# Patient Record
Sex: FEMALE | Race: WHITE | NOT HISPANIC OR LATINO | Employment: FULL TIME | URBAN - METROPOLITAN AREA
[De-identification: names, ages, dates, MRNs, and addresses within clinical notes are randomized per-mention and may not be internally consistent; named-entity substitution may affect disease eponyms.]

---

## 2017-01-24 ENCOUNTER — ALLIED HEALTH/NURSE VISIT (OUTPATIENT)
Dept: NURSING | Facility: CLINIC | Age: 47
End: 2017-01-24
Payer: COMMERCIAL

## 2017-01-24 VITALS
DIASTOLIC BLOOD PRESSURE: 82 MMHG | HEIGHT: 64 IN | BODY MASS INDEX: 34.49 KG/M2 | TEMPERATURE: 97 F | WEIGHT: 202 LBS | RESPIRATION RATE: 15 BRPM | HEART RATE: 76 BPM | SYSTOLIC BLOOD PRESSURE: 146 MMHG

## 2017-01-24 DIAGNOSIS — Z30.9 CONTRACEPTIVE MANAGEMENT: Primary | ICD-10-CM

## 2017-01-24 PROCEDURE — 99207 ZZC NO CHARGE NURSE ONLY: CPT

## 2017-01-24 PROCEDURE — 96372 THER/PROPH/DIAG INJ SC/IM: CPT

## 2017-01-24 NOTE — NURSING NOTE
"Chief Complaint   Patient presents with     Imm/Inj     depo provera       Initial /82 mmHg  Pulse 76  Temp(Src) 97  F (36.1  C)  Resp 15  Ht 5' 3.5\" (1.613 m)  Wt 202 lb (91.627 kg)  BMI 35.22 kg/m2 Estimated body mass index is 35.22 kg/(m^2) as calculated from the following:    Height as of this encounter: 5' 3.5\" (1.613 m).    Weight as of this encounter: 202 lb (91.627 kg).  BP completed using cuff size: large right arm    Gina Heath MA    The following medication was given:     MEDICATION: Depo Provera 150mg  ROUTE: IM  SITE: Deltoid - Right  DOSE: 1ml  LOT #: [id number]  :  [employer]   EXPIRATION DATE:  07/2019  NDC#: [address]-4  Next injection due on 4/11 to 4/25/17. Card given to patient date.  Gina Heath MA  "

## 2017-04-12 ENCOUNTER — ALLIED HEALTH/NURSE VISIT (OUTPATIENT)
Dept: NURSING | Facility: CLINIC | Age: 47
End: 2017-04-12
Payer: COMMERCIAL

## 2017-04-12 DIAGNOSIS — Z30.9 CONTRACEPTION MANAGEMENT: Primary | ICD-10-CM

## 2017-04-12 PROCEDURE — 99207 ZZC NO CHARGE NURSE ONLY: CPT

## 2017-04-12 PROCEDURE — 96372 THER/PROPH/DIAG INJ SC/IM: CPT

## 2017-04-12 NOTE — MR AVS SNAPSHOT
"              After Visit Summary   2017    Elisabet Dominguez    MRN: [id number]           Patient Information     Date Of Birth          1970        Visit Information        Provider Department      2017 11:30 AM [facility]        Today's Diagnoses     Contraception management    -  1       Follow-ups after your visit        Who to contact     If you have questions or need follow up information about today's clinic visit or your schedule please contact [facility] directly at [phone].  Normal or non-critical lab and imaging results will be communicated to you by TopiVerthart, letter or phone within 4 business days after the clinic has received the results. If you do not hear from us within 7 days, please contact the clinic through TopiVerth[employer] or phone. If you have a critical or abnormal lab result, we will notify you by phone as soon as possible.  Submit refill requests through Matchbox or call your pharmacy and they will forward the refill request to us. Please allow 3 business days for your refill to be completed.          Additional Information About Your Visit        MyChart Information     Matchbox lets you send messages to your doctor, view your test results, renew your prescriptions, schedule appointments and more. To sign up, go to [email or web address] . Click on \"Log in\" on the left side of the screen, which will take you to the Welcome page. Then click on \"Sign up Now\" on the right side of the page.     You will be asked to enter the access code listed below, as well as some personal information. Please follow the directions to create your username and password.     Your access code is: [email or web address]  Expires: 2017 12:00 PM     Your access code will  in 90 days. If you need help or a new code, please call your [facility]3] or [phone].        Care EveryWhere ID     This is your Care EveryWhere ID. This could be used by other organizations to " access your [place] medical records  [id number]         Blood Pressure from Last 3 Encounters:   01/24/17 146/82   05/20/16 114/82   04/14/16 138/88    Weight from Last 3 Encounters:   01/24/17 202 lb (91.6 kg)   05/20/16 200 lb (90.7 kg)   04/14/16 199 lb (90.3 kg)              We Performed the Following     INJECTION INTRAMUSCULAR OR SUB-Q     Medroxyprogesterone inj  1mg   (Depo Provera J-Code)        Primary Care Provider Office Phone # Fax #    Chloé Wilder [phone]       [address] [address]        Thank you!     Thank you for choosing [facility]  for your care. Our goal is always to provide you with excellent care. Hearing back from our patients is one way we can continue to improve our services. Please take a few minutes to complete the written survey that you may receive in the mail after your visit with us. Thank you!             Your Updated Medication List - Protect others around you: Learn how to safely use, store and throw away your medicines at www.disposemymeds.org.          This list is accurate as of: 4/12/17 12:00 PM.  Always use your most recent med list.                   Brand Name Dispense Instructions for use    medroxyPROGESTERone 150 MG/ML injection    DEPO-PROVERA    1 mL    Inject 1 mL (150 mg) into the muscle every 3 months

## 2017-04-12 NOTE — PROGRESS NOTES
Chief Complaint   Patient presents with     Allied Health Visit     Imm/Inj     Depo     Prior to injection verified patient identity using patient's name and date of birth.    Per orders of Dr. Wilder, injection of Depo provera given by Susan Benedict. Patient instructed to remain in clinic for 20 minutes afterwards, and to report any adverse reaction to me immediately.

## 2017-05-17 ENCOUNTER — OFFICE VISIT (OUTPATIENT)
Dept: ORTHOPEDICS | Facility: CLINIC | Age: 47
End: 2017-05-17
Payer: COMMERCIAL

## 2017-05-17 VITALS
SYSTOLIC BLOOD PRESSURE: 146 MMHG | DIASTOLIC BLOOD PRESSURE: 90 MMHG | WEIGHT: 199.8 LBS | BODY MASS INDEX: 34.11 KG/M2 | RESPIRATION RATE: 16 BRPM | HEART RATE: 101 BPM | HEIGHT: 64 IN

## 2017-05-17 DIAGNOSIS — G56.31 SUPERFICIAL RADIAL NERVE LESION, RIGHT: Primary | ICD-10-CM

## 2017-05-17 PROCEDURE — 99203 OFFICE O/P NEW LOW 30 MIN: CPT | Performed by: ORTHOPAEDIC SURGERY

## 2017-05-17 ASSESSMENT — PAIN SCALES - GENERAL: PAINLEVEL: NO PAIN (1)

## 2017-05-17 NOTE — Clinical Note
5/17/2017       RE: Elisabet Dominguez  [address]   [place] [address]           Dear Colleague,    Thank you for referring your patient, Elisabet Dominguez, to the [facility]. Please see a copy of my visit note below.    CHIEF COMPLAINT:   Chief Complaint   Patient presents with     Thumb Discomfort     Right thumb numbness. Onset: 2 weeks. NKI. Patient states her thumb is numb to the touch. Denies any pain. Over the last couple of weeks the numbness has gotten progressively worse, especially when stretching right arm out. No treatments.       HISTORY:  Elisabet Dominguez is a 47 year old female, Right -hand dominant who is seen in for right hand numbness. She states these symptoms started 2 weeks ago, with no known injury. She reports her thumb is numb to the touch. Denies any pain today. Over the last couple of weeks, she states numbness have gotten progressively worse. Numbing sensation with stretching. She states that there is no pain. Dorsal and radial thumb numbness that radiates into the wrist. No treatments. No previous numbness and tingling.      Suspected cause: Due to unknown factors.    Pain severity: 1/10  Pain quality: dull and pins and needles  Frequency of symptoms: frequently.  Aggravating Factors: with stretching.  Relieving Factors: at rest.  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    Surgical Hx:  has a past surgical history that includes cryotherapy, cervical (1995).    Medications:   Current Outpatient Prescriptions:      medroxyPROGESTERone (DEPO-PROVERA) 150 MG/ML injection, Inject 1 mL (150 mg) into the muscle every 3 months,  "Disp: 1 mL, Rfl: 4    Allergies:   Allergies   Allergen Reactions     Nkda [No Known Drug Allergies]        Social Hx:  reports that she quit smoking about 13 months ago. Her smoking use included Cigarettes. She has a 2.00 pack-year smoking history. She has never used smokeless tobacco. She reports that she drinks about 1.2 oz of alcohol per week  She reports that she does not use illicit drugs.    Family Hx: family history includes C.A.D. in her maternal aunt, maternal grandmother, and maternal uncle; C.A.D. (age of onset: 63) in her mother; CANCER in her father; Hypertension in her father. There is no history of Breast Cancer, Cancer - colorectal, or DIABETES.. Negative for bleeding/clotting disorders. Negative for adverse anesthesia reactions.    This document serves as a record of the services and decisions personally performed and made by Benton Coates MD. It was created on his behalf by Kasia Olson, a trained medical scribe. The creation of this document is based the provider's statements to the medical scribe.    Scribe Kasia Olson 8:52 AM 5/17/2017     REVIEW OF SYSTEMS: 10 point ROS neg other than the symptoms noted above in the HPI and PMH. Notables include  CONSTITUTIONAL:NEGATIVE for fever, chills, change in weight  INTEGUMENTARY/SKIN: NEGATIVE for worrisome rashes, moles or lesions  MUSCULOSKELETAL:See HPI above  Neurology: see HPI above.      EXAM:  /90  Pulse 101  Resp 16  Ht 1.613 m (5' 3.5\")  Wt 90.6 kg (199 lb 12.8 oz)  BMI 34.84 kg/m2  GENERAL APPEARANCE: healthy, alert and no distress   GAIT: NORMAL  SKIN: no suspicious lesions or rashes  RESPIRATORY: No increased work of breathing.  NEURO:     strength: normal,    thenar fasiculations: negative    Thenar atrophy:none.    Sensation intact in  all digits,    reflexes normal in upper extremities.   PSYCH:  mentation appears normal and affect normal, not anxious.    MUSCULOSKELETAL:    RIGHT HAND/FINGERS:    Skin intact. No abnormal " skin discoloration, erythema or ecchymosis. Mild arthritic changes of the fingers.  No nail pitting or clubbing.  Normal wear pattern, color and tone.  No observable or palpable masses of the fingers or palm or wrist.  No palpable triggering of fingers.   No observable or palpable cords or nodules of the fingers or palm.    There is no swelling in the wrist, hand, forearm.  There is no tenderness in the wrist.  There is no ecchymosis.  There is no erythema of the surrounding skin.  There is no maceration of the skin.  There is no deformity in the area.  There is mild numbness and tingling sensation over the dorsum of the thumb.  Intact extensors. No extensor lag.    Special tests wrist:    Tinel's negative, negative over superficial radial nerve   Phalen's not tested.    Median nerve compression test negative.   Finkelstein's negative    Intact sensation light touch median, radial, ulnar nerves of the hand  Intact sensation to the radial and ulnar digital nerves of the fingers, as well as the finger tips.  Intact epl fpl fdp edc wrist flexion/extension biceps/triceps deltoid  Brisk capillary refill to all fingers.   Palpable radial pulse, 2+.      LEFT HAND/FINGERS:    Skin intact. No abnormal skin discoloration, erythema or ecchymosis. Mild arthritic changes of the fingers, notably left index finger.  No nail pitting or clubbing.  Normal wear pattern, color and tone.  No observable or palpable masses of the fingers or palm or wrist.  No palpable triggering of fingers.   No observable or palpable cords or nodules of the fingers or palm.    There is no swelling in the wrist, hand, forearm.  There is no tenderness in the wrist.  There is no ecchymosis.  There is no erythema of the surrounding skin.  There is no maceration of the skin.  There is no deformity in the area.  Intact extensors. No extensor lag.    Special tests wrist:    Tinel's negative,    Phalen's not tested.    Median nerve compression test  negative.   Finkelstein's negative    Intact sensation light touch median, radial, ulnar nerves of the hand  Intact sensation to the radial and ulnar digital nerves of the fingers, as well as the finger tips.  Intact epl fpl fdp edc wrist flexion/extension biceps/triceps deltoid  Brisk capillary refill to all fingers.   Palpable radial pulse, 2+.    XRAYS: none taken.      ASSESSMENT: 47 year old right-handed female with right thumb numbness and tingling, superficial radial nerve agitation.    PLAN:  * Reviewed clinical exam findings.   * Discussed that based on examination, it is not likely that she has carpal tunnel syndrome.  * Explained that her symptoms are likely being caused by agitation of the superficial radial nerve.  * Typically, carpal tunnel syndrome affect the tips of the fingertips and palm, rather than over the top of the thumb.  * Advised patient that treatment options are non-operative treatment at this time.  * likely to resolve on its own    * Rest  * Activity modification - avoid activities that aggravate symptoms.  * NSAIDS - regular use for inflammation, with food, as long as no contra-indications. Please discuss with pcp if needed.  * Ice twice daily to three times daily.  * Elevation of extremity to reduce swelling  * PT ordered for strengthening, stretching and range of motion exercises  * Tylenol as needed for pain  * Return to clinic as needed.     The information in this document, created by a scribe for me, accurately reflects the services I personally performed and the decisions made by me. I have reviewed and approved this document for accuracy.      Benton Coates M.D., M.S.  Dept. of Orthopaedic Surgery  [facility]      Again, thank you for allowing me to participate in the care of your patient.        Sincerely,              Benton Coates MD

## 2017-05-17 NOTE — NURSING NOTE
"Chief Complaint   Patient presents with     Thumb Discomfort     Right thumb numbness. Onset: 2 weeks. NKI. Patient states her thumb is numb to the touch. Denies any pain. Over the last couple of weeks the numbness has gotten progressively worse, especially when stretching right arm out. No treatments.       Initial /90  Pulse 101  Resp 16  Ht 1.613 m (5' 3.5\")  Wt 90.6 kg (199 lb 12.8 oz)  BMI 34.84 kg/m2 Estimated body mass index is 34.84 kg/(m^2) as calculated from the following:    Height as of this encounter: 1.613 m (5' 3.5\").    Weight as of this encounter: 90.6 kg (199 lb 12.8 oz).  Medication Reconciliation: gina Clemente Certified Medical Assistant    "

## 2017-05-17 NOTE — LETTER
5/17/2017      RE: Elisabet Dominguez  [address]   [place] [address]       CHIEF COMPLAINT:   Chief Complaint   Patient presents with     Thumb Discomfort     Right thumb numbness. Onset: 2 weeks. NKI. Patient states her thumb is numb to the touch. Denies any pain. Over the last couple of weeks the numbness has gotten progressively worse, especially when stretching right arm out. No treatments.       HISTORY:  Elisabet Dominguez is a 47 year old female, Right -hand dominant who is seen in for right thumb numbness. She states these symptoms started 2 weeks ago, with no known injury. She reports her thumb is numb to the touch over the top. Denies any pain today. Over the last couple of weeks, she states numbness have gotten progressively worse. Numbing sensation with stretching of her arm/shoulder. She states that there is no pain. Dorsal and radial thumb numbness that radiates into the wrist. No treatments. No previous numbness and tingling.      Suspected cause: stretching out the right arm  Pain severity: 1/10  Pain quality: dull and pins and needles  Frequency of symptoms: frequently.  Aggravating Factors: with stretching.  Relieving Factors: at rest.  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  Patient Active Problem List    Diagnosis Date Noted     Hypertension goal BP (blood pressure) < 140/90      Obesity 06/24/2011     CARDIOVASCULAR SCREENING; LDL GOAL LESS THAN 160 10/31/2010       Surgical Hx:  has a past surgical history that includes cryotherapy, cervical (1995).    Medications:   Current Outpatient Prescriptions:      medroxyPROGESTERone  "(DEPO-PROVERA) 150 MG/ML injection, Inject 1 mL (150 mg) into the muscle every 3 months, Disp: 1 mL, Rfl: 4    Allergies:   Allergies   Allergen Reactions     Nkda [No Known Drug Allergies]        Social Hx: works in [employer].  reports that she quit smoking about 13 months ago. Her smoking use included Cigarettes. She has a 2.00 pack-year smoking history. She has never used smokeless tobacco. She reports that she drinks about 1.2 oz of alcohol per week  She reports that she does not use illicit drugs.    Family Hx: family history includes C.A.D. in her maternal aunt, maternal grandmother, and maternal uncle; C.A.D. (age of onset: 63) in her mother; CANCER in her father; Hypertension in her father. There is no history of Breast Cancer, Cancer - colorectal, or DIABETES.    This document serves as a record of the services and decisions personally performed and made by Benton Coates MD. It was created on his behalf by Kasia Olson, a trained medical scribe. The creation of this document is based the provider's statements to the medical scribe.    Scribe Kasia Olson 8:52 AM 5/17/2017     REVIEW OF SYSTEMS: 10 point ROS neg other than the symptoms noted above in the HPI and PMH. Notables include  CONSTITUTIONAL:NEGATIVE for fever, chills, change in weight  INTEGUMENTARY/SKIN: NEGATIVE for worrisome rashes, moles or lesions  MUSCULOSKELETAL:See HPI above  Neurology: see HPI above.      EXAM:  /90  Pulse 101  Resp 16  Ht 1.613 m (5' 3.5\")  Wt 90.6 kg (199 lb 12.8 oz)  BMI 34.84 kg/m2  GENERAL APPEARANCE: healthy, alert and no distress   GAIT: NORMAL  SKIN: no suspicious lesions or rashes  RESPIRATORY: No increased work of breathing.  NEURO:     strength: normal,    thenar fasiculations: negative    Thenar atrophy:none.    Sensation intact in  all digits,    reflexes normal in upper extremities.   PSYCH:  mentation appears normal and affect normal, not anxious.    MUSCULOSKELETAL:    RIGHT HAND/FINGERS:  "   Skin intact. No abnormal skin discoloration, erythema or ecchymosis. Mild arthritic changes of the fingers at the IP joints.  No nail pitting or clubbing.  Normal wear pattern, color and tone.  No observable or palpable masses of the fingers or palm or wrist.  No palpable triggering of fingers.   No observable or palpable cords or nodules of the fingers or palm.    There is no swelling in the wrist, hand, forearm.  There is no tenderness in the wrist.  There is no ecchymosis.  There is no erythema of the surrounding skin.  There is no maceration of the skin.  There is no deformity in the area.  There is mild numbness and tingling sensation over the dorsum of the thumb.  Intact extensors. No extensor lag.    Special tests wrist:    Tinel's negative, negative over superficial radial nerve   Phalen's not tested.    Median nerve compression test negative.   Finkelstein's negative    Intact sensation light touch median, radial, ulnar nerves of the hand  Intact sensation to the radial and ulnar digital nerves of the fingers, as well as the finger tips.  Intact epl fpl fdp edc wrist flexion/extension biceps/triceps deltoid  Brisk capillary refill to all fingers.   Palpable radial pulse, 2+.      LEFT HAND/FINGERS:    Skin intact. No abnormal skin discoloration, erythema or ecchymosis. Mild arthritic changes of the fingers, notably left index finger DIP joint  No nail pitting or clubbing.  Normal wear pattern, color and tone.  No observable or palpable masses of the fingers or palm or wrist.  No palpable triggering of fingers.   No observable or palpable cords or nodules of the fingers or palm.    There is no swelling in the wrist, hand, forearm.  There is no tenderness in the wrist.  There is no ecchymosis.  There is no erythema of the surrounding skin.  There is no maceration of the skin.  There is no deformity in the area.  Intact extensors. No extensor lag.    Special tests wrist:    Tinel's negative,    Phalen's not  tested.    Median nerve compression test negative.   Finkelstein's negative    Intact sensation light touch median, radial, ulnar nerves of the hand  Intact sensation to the radial and ulnar digital nerves of the fingers, as well as the finger tips.  Intact epl fpl fdp edc wrist flexion/extension biceps/triceps deltoid  Brisk capillary refill to all fingers.   Palpable radial pulse, 2+.    XRAYS: none taken.      ASSESSMENT: 47 year old right-handed female with right thumb numbness and tingling, superficial radial nerve agitation.    PLAN:  * Reviewed clinical exam findings.   * Discussed that based on examination, it is not likely that she has carpal tunnel syndrome.  * Explained that her symptoms are likely being caused by agitation of the superficial radial nerve.  * Typically, carpal tunnel syndrome affect the tips of the fingertips and palm, rather than over the top of the thumb.  * Advised patient that treatment options are non-operative treatment at this time.  * likely to resolve on its own    * Return to clinic as needed. If symptoms worsen, likely referral to neurology.     The information in this document, created by a scribe for me, accurately reflects the services I personally performed and the decisions made by me. I have reviewed and approved this document for accuracy.      Benton Coates M.D., M.S.  Dept. of Orthopaedic Surgery  [facility]            Benton Coates MD

## 2017-05-17 NOTE — PATIENT INSTRUCTIONS
Please remember to call and schedule a follow up appointment if one was recommended at your earliest convenience.  Orthopedics CLINIC HOURS TELEPHONE NUMBER   Dr. Aminata Davis  Certified Medical Assistant   Monday & Wednesday   8am - 5pm  Thursday 1pm - 5pm  Friday 8am -11:30am Specialty schedulers:   [phone] to schedule your surgery.  Main Clinic:   [phone] to make an appointment with any provider.    Urgent Care locations:    [facility] Monday-Friday Closed  Saturday-Sunday 9am-5pm      Monday-Friday 12pm - 8pm  Saturday-Sunday 9am-5pm [phone]     If SURGERY has been recommended, please call our Specialty Schedulers at [phone] to schedule your procedure.    If you need a medication refill, please contact your pharmacy. Please allow 3 business days for your refill to be completed.    If an MRI or CT scan has been recommended, please call [facility] Schedulers at [phone] to schedule your appointment.  Use [employer] (secure e-mail communication and access to your chart) to send a message or to make an appointment. Please ask how you can sign up for [employer].  Your care team's suggested websites for health information:   [email or web address] : Up to date and easily searchable information on multiple topics.   [email or web address] : [place] dept of heat, public health issues in [place], N1N1

## 2017-05-17 NOTE — MR AVS SNAPSHOT
After Visit Summary   5/17/2017    Elisabet Dominguez    MRN: [id number]           Patient Information     Date Of Birth          1970        Visit Information        Provider Department      5/17/2017 8:00 AM Benton Coates MD [facility]        Care Instructions    Please remember to call and schedule a follow up appointment if one was recommended at your earliest convenience.  Orthopedics CLINIC HOURS TELEPHONE NUMBER   Dr. Aminata Davis  Certified Medical Assistant   Monday & Wednesday   8am - 5pm  Thursday 1pm - 5pm  Friday 8am -11:30am Specialty schedulers:   [phone] to schedule your surgery.  Main Clinic:   [phone] to make an appointment with any provider.    Urgent Care locations:    [facility] Monday-Friday Closed  Saturday-Sunday 9am-5pm      Monday-Friday 12pm - 8pm  Saturday-Sunday 9am-5pm [phone]     If SURGERY has been recommended, please call our Specialty Schedulers at [phone] to schedule your procedure.    If you need a medication refill, please contact your pharmacy. Please allow 3 business days for your refill to be completed.    If an MRI or CT scan has been recommended, please call [facility] Schedulers at [phone] to schedule your appointment.  Use Apontadort (secure e-mail communication and access to your chart) to send a message or to make an appointment. Please ask how you can sign up for JeNaCell.  Your care team's suggested websites for health information:   [email or web address] : Up to date and easily searchable information on multiple topics.   [email or web address] : [place] dept of heat, public health issues in [place], N1N1            Follow-ups after your visit        Who to contact     If you have questions or need follow up information about today's clinic visit or your schedule please contact [facility] directly at [phone].  Normal or non-critical lab  "and imaging results will be communicated to you by MyChart, letter or phone within 4 business days after the clinic has received the results. If you do not hear from us within 7 days, please contact the clinic through TeraD[employer]et or phone. If you have a critical or abnormal lab result, we will notify you by phone as soon as possible.  Submit refill requests through GridBridge or call your pharmacy and they will forward the refill request to us. Please allow 3 business days for your refill to be completed.          Additional Information About Your Visit        "[employer] Information     GridBridge lets you send messages to your doctor, view your test results, renew your prescriptions, schedule appointments and more. To sign up, go to [email or web address] . Click on \"Log in\" on the left side of the screen, which will take you to the Welcome page. Then click on \"Sign up Now\" on the right side of the page.     You will be asked to enter the access code listed below, as well as some personal information. Please follow the directions to create your username and password.     Your access code is: [email or web address]  Expires: 2017 12:00 PM     Your access code will  in 90 days. If you need help or a new code, please call your [place] clinic or [phone].        Care EveryWhere ID     This is your Care EveryWhere ID. This could be used by other organizations to access your [place] medical records  [id number]        Your Vitals Were     Pulse Respirations Height BMI (Body Mass Index)          101 16 1.613 m (5' 3.5\") 34.84 kg/m2         Blood Pressure from Last 3 Encounters:   17 146/90   17 146/82   16 114/82    Weight from Last 3 Encounters:   17 90.6 kg (199 lb 12.8 oz)   17 91.6 kg (202 lb)   16 90.7 kg (200 lb)              Today, you had the following     No orders found for display       Primary Care Provider Office Phone # Fax #    Chloé Wilder [phone]       " [facility] [address] [place] [address]  [place] [address]        Thank you!     Thank you for choosing [facility]  for your care. Our goal is always to provide you with excellent care. Hearing back from our patients is one way we can continue to improve our services. Please take a few minutes to complete the written survey that you may receive in the mail after your visit with us. Thank you!             Your Updated Medication List - Protect others around you: Learn how to safely use, store and throw away your medicines at www.disposemymeds.org.          This list is accurate as of: 5/17/17  8:06 AM.  Always use your most recent med list.                   Brand Name Dispense Instructions for use    medroxyPROGESTERone 150 MG/ML injection    DEPO-PROVERA    1 mL    Inject 1 mL (150 mg) into the muscle every 3 months

## 2017-05-17 NOTE — PROGRESS NOTES
CHIEF COMPLAINT:   Chief Complaint   Patient presents with     Thumb Discomfort     Right thumb numbness. Onset: 2 weeks. NKI. Patient states her thumb is numb to the touch. Denies any pain. Over the last couple of weeks the numbness has gotten progressively worse, especially when stretching right arm out. No treatments.       HISTORY:  Elisabet Dominguez is a 47 year old female, Right -hand dominant who is seen in for right thumb numbness. She states these symptoms started 2 weeks ago, with no known injury. She reports her thumb is numb to the touch over the top. Denies any pain today. Over the last couple of weeks, she states numbness have gotten progressively worse. Numbing sensation with stretching of her arm/shoulder. She states that there is no pain. Dorsal and radial thumb numbness that radiates into the wrist. No treatments. No previous numbness and tingling.      Suspected cause: stretching out the right arm  Pain severity: 1/10  Pain quality: dull and pins and needles  Frequency of symptoms: frequently.  Aggravating Factors: with stretching.  Relieving Factors: at rest.  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  Patient Active Problem List    Diagnosis Date Noted     Hypertension goal BP (blood pressure) < 140/90      Obesity 06/24/2011     CARDIOVASCULAR SCREENING; LDL GOAL LESS THAN 160 10/31/2010       Surgical Hx:  has a past surgical history that includes cryotherapy, cervical (1995).    Medications:   Current Outpatient Prescriptions:      medroxyPROGESTERone (DEPO-PROVERA) 150 MG/ML injection, Inject 1 mL (150 mg) into the muscle every 3 months, Disp: 1  "mL, Rfl: 4    Allergies:   Allergies   Allergen Reactions     Nkda [No Known Drug Allergies]        Social Hx: works in [employer].  reports that she quit smoking about 13 months ago. Her smoking use included Cigarettes. She has a 2.00 pack-year smoking history. She has never used smokeless tobacco. She reports that she drinks about 1.2 oz of alcohol per week  She reports that she does not use illicit drugs.    Family Hx: family history includes C.A.D. in her maternal aunt, maternal grandmother, and maternal uncle; C.A.D. (age of onset: 63) in her mother; CANCER in her father; Hypertension in her father. There is no history of Breast Cancer, Cancer - colorectal, or DIABETES.    This document serves as a record of the services and decisions personally performed and made by Benton Coates MD. It was created on his behalf by Kasia Olson, a trained medical scribe. The creation of this document is based the provider's statements to the medical scribe.    Scribe Kasia Olson 8:52 AM 5/17/2017     REVIEW OF SYSTEMS: 10 point ROS neg other than the symptoms noted above in the HPI and PMH. Notables include  CONSTITUTIONAL:NEGATIVE for fever, chills, change in weight  INTEGUMENTARY/SKIN: NEGATIVE for worrisome rashes, moles or lesions  MUSCULOSKELETAL:See HPI above  Neurology: see HPI above.      EXAM:  /90  Pulse 101  Resp 16  Ht 1.613 m (5' 3.5\")  Wt 90.6 kg (199 lb 12.8 oz)  BMI 34.84 kg/m2  GENERAL APPEARANCE: healthy, alert and no distress   GAIT: NORMAL  SKIN: no suspicious lesions or rashes  RESPIRATORY: No increased work of breathing.  NEURO:     strength: normal,    thenar fasiculations: negative    Thenar atrophy:none.    Sensation intact in  all digits,    reflexes normal in upper extremities.   PSYCH:  mentation appears normal and affect normal, not anxious.    MUSCULOSKELETAL:    RIGHT HAND/FINGERS:    Skin intact. No abnormal skin discoloration, erythema or ecchymosis. Mild arthritic changes of the " fingers at the IP joints.  No nail pitting or clubbing.  Normal wear pattern, color and tone.  No observable or palpable masses of the fingers or palm or wrist.  No palpable triggering of fingers.   No observable or palpable cords or nodules of the fingers or palm.    There is no swelling in the wrist, hand, forearm.  There is no tenderness in the wrist.  There is no ecchymosis.  There is no erythema of the surrounding skin.  There is no maceration of the skin.  There is no deformity in the area.  There is mild numbness and tingling sensation over the dorsum of the thumb.  Intact extensors. No extensor lag.    Special tests wrist:    Tinel's negative, negative over superficial radial nerve   Phalen's not tested.    Median nerve compression test negative.   Finkelstein's negative    Intact sensation light touch median, radial, ulnar nerves of the hand  Intact sensation to the radial and ulnar digital nerves of the fingers, as well as the finger tips.  Intact epl fpl fdp edc wrist flexion/extension biceps/triceps deltoid  Brisk capillary refill to all fingers.   Palpable radial pulse, 2+.      LEFT HAND/FINGERS:    Skin intact. No abnormal skin discoloration, erythema or ecchymosis. Mild arthritic changes of the fingers, notably left index finger DIP joint  No nail pitting or clubbing.  Normal wear pattern, color and tone.  No observable or palpable masses of the fingers or palm or wrist.  No palpable triggering of fingers.   No observable or palpable cords or nodules of the fingers or palm.    There is no swelling in the wrist, hand, forearm.  There is no tenderness in the wrist.  There is no ecchymosis.  There is no erythema of the surrounding skin.  There is no maceration of the skin.  There is no deformity in the area.  Intact extensors. No extensor lag.    Special tests wrist:    Tinel's negative,    Phalen's not tested.    Median nerve compression test negative.   Finkelstein's negative    Intact sensation light  touch median, radial, ulnar nerves of the hand  Intact sensation to the radial and ulnar digital nerves of the fingers, as well as the finger tips.  Intact epl fpl fdp edc wrist flexion/extension biceps/triceps deltoid  Brisk capillary refill to all fingers.   Palpable radial pulse, 2+.    XRAYS: none taken.      ASSESSMENT: 47 year old right-handed female with right thumb numbness and tingling, superficial radial nerve agitation.    PLAN:  * Reviewed clinical exam findings.   * Discussed that based on examination, it is not likely that she has carpal tunnel syndrome.  * Explained that her symptoms are likely being caused by agitation of the superficial radial nerve.  * Typically, carpal tunnel syndrome affect the tips of the fingertips and palm, rather than over the top of the thumb.  * Advised patient that treatment options are non-operative treatment at this time.  * likely to resolve on its own    * Return to clinic as needed. If symptoms worsen, likely referral to neurology.     The information in this document, created by a scribe for me, accurately reflects the services I personally performed and the decisions made by me. I have reviewed and approved this document for accuracy.      Benton Coates M.D., M.S.  Dept. of Orthopaedic Surgery  [facility]

## 2017-06-27 ENCOUNTER — TELEPHONE (OUTPATIENT)
Dept: FAMILY MEDICINE | Facility: CLINIC | Age: 47
End: 2017-06-27

## 2017-06-27 DIAGNOSIS — Z30.42 ENCOUNTER FOR DEPO-PROVERA CONTRACEPTION: ICD-10-CM

## 2017-06-27 RX ORDER — MEDROXYPROGESTERONE ACETATE 150 MG/ML
1 INJECTION, SUSPENSION INTRAMUSCULAR
Qty: 1 ML | Refills: 0 | OUTPATIENT
Start: 2017-06-27 | End: 2018-03-23

## 2017-06-27 NOTE — TELEPHONE ENCOUNTER
Patient has ancillary appointment 6/28/2017 for depo. If appropriate, please send new Rx for depo.  Ellie HERNÁNDEZ CMA ([facility])

## 2017-06-28 ENCOUNTER — ALLIED HEALTH/NURSE VISIT (OUTPATIENT)
Dept: NURSING | Facility: CLINIC | Age: 47
End: 2017-06-28
Payer: COMMERCIAL

## 2017-06-28 PROCEDURE — 96372 THER/PROPH/DIAG INJ SC/IM: CPT

## 2017-06-28 PROCEDURE — 99207 ZZC NO CHARGE NURSE ONLY: CPT

## 2017-06-28 NOTE — NURSING NOTE
"Chief Complaint   Patient presents with     Contraception     Depo Injection       Initial There were no vitals taken for this visit. Estimated body mass index is 34.84 kg/(m^2) as calculated from the following:    Height as of 5/17/17: 5' 3.5\" (1.613 m).    Weight as of 5/17/17: 199 lb 12.8 oz (90.6 kg).  Medication Reconciliation: unable or not appropriate to perform   Prior to injection verified patient identity using patient's name and date of birth.  BLOOD PRESSURE: Data Unavailable    DATE OF LAST PAP or ANNUAL EXAM:   Lab Results   Component Value Date    PAP NIL 02/16/2015     URINE HCG:not indicated    The following medication was given:     MEDICATION: Depo Provera 150mg  ROUTE: IM  SITE: Deltoid - Right  : [employer]  LOT #: [id number]  EXPIRATION:12/2019  NEXT INJECTION DUE: 9/13-9/27/2017. Patient given reminder card.  Provider: Meño HERNÁNDEZ CMA ([facility])        "

## 2017-06-28 NOTE — TELEPHONE ENCOUNTER
Spoke to patient let her know that she is due for Physical and Mammogram. Schedule appointment 7/12 @ 7:00 and 7:20 for mammo and physical    Gina Heath MA

## 2017-06-28 NOTE — MR AVS SNAPSHOT
After Visit Summary   6/28/2017    Elisabet Dominguez    MRN: [id number]           Patient Information     Date Of Birth          1970        Visit Information        Provider Department      6/28/2017 12:00 PM F[facility] ANCILLARY [facility]        Today's Diagnoses     Contraception    -  1       Follow-ups after your visit        Your next 10 appointments already scheduled     Jul 12, 2017  7:00 AM CDT   MA SCREENING DIGITAL BILATERAL with FKMA1   [facility] ([facility])    [address] [id number] [phone]           Do not use any powder, lotion or deodorant under your arms or on your breast. If you do, we will ask you to remove it before your exam.  Wear comfortable, two-piece clothing.  If you have any allergies, tell your care team.  Bring any previous mammograms from other facilities or have them mailed to the breast center.            Jul 12, 2017  7:20 AM CDT   Office Visit with MD Preston [facility] Eddie Uribe ([facility])    [address] [id number] [phone]           Bring a current list of meds and any records pertaining to this visit.  For Physicals, please bring immunization records and any forms needing to be filled out.  Please arrive 10 minutes early to complete paperwork.              Future tests that were ordered for you today     Open Future Orders        Priority Expected Expires Ordered    MA Screening Digital Bilateral Routine  6/28/2018 6/28/2017            Who to contact     If you have questions or need follow up information about today's clinic visit or your schedule please contact [facility] directly at [phone].  Normal or non-critical lab and imaging results will be communicated to you by MyChart, letter or phone within 4 business days after the clinic has received the results. If you do not hear from us within 7 days, please contact the  "clinic through [employer] or phone. If you have a critical or abnormal lab result, we will notify you by phone as soon as possible.  Submit refill requests through [employer] or call your pharmacy and they will forward the refill request to us. Please allow 3 business days for your refill to be completed.          Additional Information About Your Visit        [employer] Information     [employer] Software lets you send messages to your doctor, view your test results, renew your prescriptions, schedule appointments and more. To sign up, go to [email or web address] . Click on \"Log in\" on the left side of the screen, which will take you to the Welcome page. Then click on \"Sign up Now\" on the right side of the page.     You will be asked to enter the access code listed below, as well as some personal information. Please follow the directions to create your username and password.     Your access code is: [email or web address]  Expires: 2017 12:00 PM     Your access code will  in 90 days. If you need help or a new code, please call your [place] clinic or [phone].        Care EveryWhere ID     This is your Care EveryWhere ID. This could be used by other organizations to access your [place] medical records  [id number]         Blood Pressure from Last 3 Encounters:   17 146/90   17 146/82   16 114/82    Weight from Last 3 Encounters:   17 199 lb 12.8 oz (90.6 kg)   17 202 lb (91.6 kg)   16 200 lb (90.7 kg)              We Performed the Following     INJECTION INTRAMUSCULAR OR SUB-Q     Medroxyprogesterone inj  1mg   (Depo Provera J-Code)        Primary Care Provider Office Phone # Fax #    Chloé Wilder [phone]       [facility] [address] [address]        Equal Access to Services     DELANEY DAS : Giovanny knappo Sotarun, waaxda luqadaha, qaybta kaalsusan raza, radha lozada. So [facility] " [phone].    ATENCIÓN: Si ibrahima william, tiene a landa disposición servicios gratuitos de asistencia lingüística. Jonathan al [phone].    We comply with applicable federal civil rights laws and [place] laws. We do not discriminate on the basis of race, color, national origin, age, disability sex, sexual orientation or gender identity.            Thank you!     Thank you for choosing [facility]  for your care. Our goal is always to provide you with excellent care. Hearing back from our patients is one way we can continue to improve our services. Please take a few minutes to complete the written survey that you may receive in the mail after your visit with us. Thank you!             Your Updated Medication List - Protect others around you: Learn how to safely use, store and throw away your medicines at www.disposemymeds.org.          This list is accurate as of: 6/28/17  1:42 PM.  Always use your most recent med list.                   Brand Name Dispense Instructions for use Diagnosis    medroxyPROGESTERone 150 MG/ML injection    DEPO-PROVERA    1 mL    Inject 1 mL (150 mg) into the muscle every 3 months    Encounter for Depo-Provera contraception

## 2017-06-28 NOTE — PROGRESS NOTES
Follow Up Injection    Patient returning during stated date range given at previous visit: Yes      If here at the correct interval:   BP Readings from Last 1 Encounters:   05/17/17 146/90     Wt Readings from Last 1 Encounters:   05/17/17 199 lb 12.8 oz (90.6 kg)       Last Pap/exam date:   Lab Results   Component Value Date    PAP NIL 02/16/2015           Side effects or problems with last injection?  No.  Date range is given to patient for next dose: 9/13-9/27/2017    See Medication Note for administration information    Staff Sig: Ellie HERNÁNDEZ CMA ([facility])

## 2017-10-20 ENCOUNTER — OFFICE VISIT (OUTPATIENT)
Dept: FAMILY MEDICINE | Facility: CLINIC | Age: 47
End: 2017-10-20
Payer: COMMERCIAL

## 2017-10-20 VITALS
HEART RATE: 103 BPM | DIASTOLIC BLOOD PRESSURE: 78 MMHG | BODY MASS INDEX: 34.35 KG/M2 | OXYGEN SATURATION: 97 % | SYSTOLIC BLOOD PRESSURE: 136 MMHG | WEIGHT: 197 LBS | TEMPERATURE: 98.3 F

## 2017-10-20 DIAGNOSIS — Z12.31 VISIT FOR SCREENING MAMMOGRAM: ICD-10-CM

## 2017-10-20 DIAGNOSIS — R21 RASH AND NONSPECIFIC SKIN ERUPTION: Primary | ICD-10-CM

## 2017-10-20 DIAGNOSIS — Z23 NEED FOR PROPHYLACTIC VACCINATION AND INOCULATION AGAINST INFLUENZA: ICD-10-CM

## 2017-10-20 PROCEDURE — 99213 OFFICE O/P EST LOW 20 MIN: CPT | Performed by: FAMILY MEDICINE

## 2017-10-20 RX ORDER — PREDNISONE 20 MG/1
40 TABLET ORAL DAILY
Qty: 10 TABLET | Refills: 0 | Status: SHIPPED | OUTPATIENT
Start: 2017-10-20 | End: 2017-10-25

## 2017-10-20 NOTE — PATIENT INSTRUCTIONS
[facility]3]    If you have any questions regarding to your visit please contact your care team:       Team Purple:   Clinic Hours Telephone Number   Dr. Lachelle Tena   7am-7pm  Monday - Thursday   7am-5pm  Fridays  [phone]  (Appointment scheduling available 24/7)    Questions about your Visit?   Team Line:  [phone]   Urgent Care - [place] and [facility] - 11am-9pm Monday-Friday Saturday-Sunday- 9am-5pm   [place] - 5pm-9pm Monday-Friday Saturday-Sunday- 9am-5pm  [phone] - [facility]  [phone] - [place]       What options do I have for visits at the clinic other than the traditional office visit?  To expand how we care for you, many of our providers are utilizing electronic visits (e-visits) and telephone visits, when medically appropriate, for interactions with their patients rather than a visit in the clinic.   We also offer nurse visits for many medical concerns. Just like any other service, we will bill your insurance company for this type of visit based on time spent on the phone with your provider. Not all insurance companies cover these visits. Please check with your medical insurance if this type of visit is covered. You will be responsible for any charges that are not paid by your insurance.      E-visits via R[employer]:  generally incur a $35.00 fee.  Telephone visits:  Time spent on the phone: *charged based on time that is spent on the phone in increments of 10 minutes. Estimated cost:   5-10 mins $30.00   11-20 mins. $59.00   21-30 mins. $85.00     Use [employer] (secure email communication and access to your chart) to send your primary care provider a message or make an appointment. Ask someone on your Team how to sign up for [employer].  For a Price Quote for your services, please call our Consumer Price Line at [phone].  As always, Thank you for trusting us with your health care  needs!    Discharge by MARKY RESENDEZ

## 2017-10-20 NOTE — PROGRESS NOTES
SUBJECTIVE:   Elisabet Dominguez is a 47 year old female who presents to clinic today for the following health issues:    Rash  Onset: x1 day    Description:   Location: Abdomen, back and going to shoulders   Character: blotchy, raised, red  Itching (Pruritis): YES    Progression of Symptoms:  unsure    Accompanying Signs & Symptoms:  Fever: no   Body aches or joint pain: no   Sore throat symptoms: no   Recent cold symptoms: no     History:   Previous similar rash: no     Precipitating factors:   Exposure to similar rash: no   New exposures: None   Recent travel: no     Alleviating factors:  Benadrl    Therapies Tried and outcome: benadryl     Problem list and histories reviewed & adjusted, as indicated.  Additional history: as documented    Patient Active Problem List   Diagnosis     CARDIOVASCULAR SCREENING; LDL GOAL LESS THAN 160     Obesity     Hypertension goal BP (blood pressure) < 140/90     Past Surgical History:   Procedure Laterality Date     CRYOTHERAPY, CERVICAL  1995       Social History   Substance Use Topics     Smoking status: Former Smoker     Packs/day: 0.10     Years: 20.00     Types: Cigarettes     Quit date: 4/2/2016     Smokeless tobacco: Never Used      Comment: weekends     Alcohol use 1.2 oz/week     2 Standard drinks or equivalent per week     Family History   Problem Relation Age of Onset     C.A.D. Mother 63     Hypertension Father      CANCER Father      bladder     C.A.D. Maternal Grandmother      C.A.D. Maternal Aunt      C.A.D. Maternal Uncle      Breast Cancer No family hx of      Cancer - colorectal No family hx of      DIABETES No family hx of          Reviewed and updated as needed this visit by clinical staff    ROS:  Constitutional, HEENT, cardiovascular, pulmonary, gi and gu systems are negative, except as otherwise noted.      OBJECTIVE:   /86  Pulse 103  Temp 98.3  F (36.8  C) (Oral)  Wt 197 lb (89.4 kg)  SpO2 97%  BMI 34.35 kg/m2  Body mass index is 34.35  kg/(m^2).  GENERAL: healthy, alert and no distress  SKIN: Scattered papular rash in trunk  NECK: no adenopathy and thyroid normal to palpation  RESP: lungs clear to auscultation - no rales, rhonchi or wheezes  CV: regular rate and rhythm, no murmur, click or rub, no peripheral edema  ABDOMEN: soft, nontender, no masses and bowel sounds normal  MS: no gross musculoskeletal defects noted, no edema    Diagnostic Test Results:  none     ASSESSMENT/PLAN:     (R21) Rash and nonspecific skin eruption  (primary encounter diagnosis)  Comment: Discussed causes of common rashes, mostly allergic and fact that a number of them cause is not clear. Due to pruritic nature and distribution discussed doing a short course of steroid and if persist beyond 1 week will call back  Plan: predniSONE (DELTASONE) 20 MG tablet    (Z12.31) Visit for screening mammogram  Plan: MA SCREENING DIGITAL BILAT - Future  (s+30)         Venkat Martin MD  [facility]

## 2017-10-20 NOTE — MR AVS SNAPSHOT
After Visit Summary   10/20/2017    Elisabet Dominguez    MRN: [id number]           Patient Information     Date Of Birth          1970        Visit Information        Provider Department      10/20/2017 2:00 PM Venkat Martin MD [facility]        Today's Diagnoses     Rash and nonspecific skin eruption    -  1    Visit for screening mammogram        Need for prophylactic vaccination and inoculation against influenza          Care Instructions    [facility]    If you have any questions regarding to your visit please contact your care team:       Team Purple:   Clinic Hours Telephone Number   Dr. Lachelle Tena   7am-7pm  Monday - Thursday   7am-5pm  Fridays  [phone]  (Appointment scheduling available 24/7)    Questions about your Visit?   Team Line:  [phone]   Urgent Care - [place] and [facility] - 11am-9pm Monday-Friday Saturday-Sunday- 9am-5pm   [place] - 5pm-9pm Monday-Friday Saturday-Sunday- 9am-5pm  [phone] - [facility]  [phone] - [place]       What options do I have for visits at the clinic other than the traditional office visit?  To expand how we care for you, many of our providers are utilizing electronic visits (e-visits) and telephone visits, when medically appropriate, for interactions with their patients rather than a visit in the clinic.   We also offer nurse visits for many medical concerns. Just like any other service, we will bill your insurance company for this type of visit based on time spent on the phone with your provider. Not all insurance companies cover these visits. Please check with your medical insurance if this type of visit is covered. You will be responsible for any charges that are not paid by your insurance.      E-visits via [employer]:  generally incur a $35.00 fee.  Telephone visits:  Time spent on the phone: *charged based on time that  "is spent on the phone in increments of 10 minutes. Estimated cost:   5-10 mins $30.00   11-20 mins. $59.00   21-30 mins. $85.00     Use [employer] (secure email communication and access to your chart) to send your primary care provider a message or make an appointment. Ask someone on your Team how to sign up for [employer].  For a Price Quote for your services, please call our [employer] Line at [phone].  As always, Thank you for trusting us with your health care needs!    Discharge by MARKY RESENDEZ             Follow-ups after your visit        Future tests that were ordered for you today     Open Future Orders        Priority Expected Expires Ordered    MA SCREENING DIGITAL BILAT - Future  (s+30) Routine  10/20/2018 10/20/2017            Who to contact     If you have questions or need follow up information about today's clinic visit or your schedule please contact [facility] directly at [phone].  Normal or non-critical lab and imaging results will be communicated to you by [employer], letter or phone within 4 business days after the clinic has received the results. If you do not hear from us within 7 days, please contact the clinic through [employer] or phone. If you have a critical or abnormal lab result, we will notify you by phone as soon as possible.  Submit refill requests through [employer] or call your pharmacy and they will forward the refill request to us. Please allow 3 business days for your refill to be completed.          Additional Information About Your Visit        [employer] Information     [employer] lets you send messages to your doctor, view your test results, renew your prescriptions, schedule appointments and more. To sign up, go to [email or web address] . Click on \"Log in\" on the left side of the screen, which will take you to the Welcome page. Then click on \"Sign up Now\" on the right side of the page.     You will be asked to enter the access code listed below, as well as some personal " information. Please follow the directions to create your username and password.     Your access code is: [email or web address]  Expires: 2018  2:18 PM     Your access code will  in 90 days. If you need help or a new code, please call your [place] clinic or [phone].        Care EveryWhere ID     This is your Care EveryWhere ID. This could be used by other organizations to access your [place] medical records  [id number]        Your Vitals Were     Pulse Temperature Pulse Oximetry BMI (Body Mass Index)          103 98.3  F (36.8  C) (Oral) 97% 34.35 kg/m2         Blood Pressure from Last 3 Encounters:   10/20/17 136/78   17 146/90   17 146/82    Weight from Last 3 Encounters:   10/20/17 197 lb (89.4 kg)   17 199 lb 12.8 oz (90.6 kg)   17 202 lb (91.6 kg)                 Today's Medication Changes          These changes are accurate as of: 10/20/17  2:18 PM.  If you have any questions, ask your nurse or doctor.               Start taking these medicines.        Dose/Directions    predniSONE 20 MG tablet   Commonly known as:  DELTASONE   Used for:  Rash and nonspecific skin eruption   Started by:  Venkat Martin MD        Dose:  40 mg   Take 2 tablets (40 mg) by mouth daily for 5 days   Quantity:  10 tablet   Refills:  0            Where to get your medicines      These medications were sent to [facility] [address] [place] [address] [address], [facility] [address]    Hours:  Test fax successful 02   Phone:  [phone]     predniSONE 20 MG tablet                Primary Care Provider Office Phone # Fax #    Chloé Wilder [phone] [address]  [place] [address]        Equal Access to Services     DELANEY DAS AH: Giovanny Jones, xena lujuan david, qaybpetrona johnsonalradha mancuso. [facility] [phone].    ATENCIÓN: Si ibrahima  español, tiene a landa disposición servicios gratuitos de asistencia lingüística. Jonathan dueñas [phone].    We comply with applicable federal civil rights laws and [place] laws. We do not discriminate on the basis of race, color, national origin, age, disability, sex, sexual orientation, or gender identity.            Thank you!     Thank you for choosing [facility]  for your care. Our goal is always to provide you with excellent care. Hearing back from our patients is one way we can continue to improve our services. Please take a few minutes to complete the written survey that you may receive in the mail after your visit with us. Thank you!             Your Updated Medication List - Protect others around you: Learn how to safely use, store and throw away your medicines at www.disposemymeds.org.          This list is accurate as of: 10/20/17  2:18 PM.  Always use your most recent med list.                   Brand Name Dispense Instructions for use Diagnosis    medroxyPROGESTERone 150 MG/ML injection    DEPO-PROVERA    1 mL    Inject 1 mL (150 mg) into the muscle every 3 months    Encounter for Depo-Provera contraception       predniSONE 20 MG tablet    DELTASONE    10 tablet    Take 2 tablets (40 mg) by mouth daily for 5 days    Rash and nonspecific skin eruption

## 2017-10-20 NOTE — NURSING NOTE
"Chief Complaint   Patient presents with     Derm Problem       Initial /86  Pulse 103  Temp 98.3  F (36.8  C) (Oral)  Wt 197 lb (89.4 kg)  SpO2 97%  BMI 34.35 kg/m2 Estimated body mass index is 34.35 kg/(m^2) as calculated from the following:    Height as of 5/17/17: 5' 3.5\" (1.613 m).    Weight as of this encounter: 197 lb (89.4 kg).  Medication Reconciliation: complete     Janet Mahan MA       "

## 2018-01-26 ENCOUNTER — TELEPHONE (OUTPATIENT)
Dept: FAMILY MEDICINE | Facility: CLINIC | Age: 48
End: 2018-01-26

## 2018-01-26 DIAGNOSIS — J10.1 INFLUENZA A: Primary | ICD-10-CM

## 2018-01-26 RX ORDER — OSELTAMIVIR PHOSPHATE 75 MG/1
75 CAPSULE ORAL 2 TIMES DAILY
Qty: 10 CAPSULE | Refills: 0 | Status: SHIPPED | OUTPATIENT
Start: 2018-01-26 | End: 2018-03-23

## 2018-01-26 NOTE — TELEPHONE ENCOUNTER
Influenza-Like Illness (TOBY) Protocol    Elisabet KILPATRICK Angelica      Age: 47 year old     YOB: 1970    Are you currently sick or have you had close contact with someone who is currently sick?   Yes, this patient is currently sick.     Adult Clinical Evaluation    Is this patient experiencing ANY of the following?  Unconsciousness or unresponsiveness No   Difficulty breathing or swallowing No   Blue or dusky lips, skin, or nail beds No   Chest pain No   Severe confusion or delirium No   Seizure activity: ongoing or stopped No   Severe dehydration or signs of shock No   Patient sounds very sick on the phone No     Is this patient experiencing ANY of the following?  Fever > 104 or shaking chills No   Wheezing with minimal response to usual wheezing medications or new wheezing No   Repeated vomiting or diarrhea with signs of dehydration (no urination within last 12 hours) No   Flu-like symptoms that initially improved but returned with fever and a worse cough No   Stiff or painful neck No   Severe headache No     Does the patient have any of the following?  Measured fever > 100 degrees No   Chills or feels very warm to the touch Yes   Cough Yes   Sore throat No   Muscle/ body aches Yes   Headaches Yes   Fatigue (tiredness) Yes     Nursing Plan      Below are conditions which place adults at increased risk for the more severe complications of influenza.    Does this patient have ANY of the following conditions?  Chronic pulmonary disease such as asthma or COPD No   Heart disease (CHF, CAD, anticoag due to arrhythmia) No   Liver disease (hepatitis, liver failure, cirrhosis) No   Kidney disease (renal failure, insufficiency or dialysis) No   Metabolic disorder (e.g. diabetes) No   Neuromuscular disorder (STEPHANIE ALVAREZ MD, myasthenia gravis) No   Compromised ability to handle respiratory secretions No   Hematologic disorder (e.g. sickle cell disease) No   HIV / AIDS No   Chemotherapy or radiation within the last 3 months No    Received an organ or a bone marrow transplant No   Taking Prednisone in excess of 20mg daily No   Is age 65 years or older No   Is pregnant, thinks she may be pregnant or is within two weeks after delivery No   Is a resident of a chronic care facility No   Is patient  or [place] native  No     Patient falls into high risk category.   TOBY symptom onset less than 48 hours.    Allergies   Allergen Reactions     Nkda [No Known Drug Allergies]        Does this patient have an allergy or hypersensitivity to Oseltamivir (Tamiflu)?  No.      Patient Active Problem List   Diagnosis     CARDIOVASCULAR SCREENING; LDL GOAL LESS THAN 160     Obesity     Hypertension goal BP (blood pressure) < 140/90       Last recorded GFR on this patient:   GFR Estimate   Date Value Ref Range Status   09/12/2012 >90 >60 mL/min/1.7m2 Final     GFR Estimate If Black   Date Value Ref Range Status   09/12/2012 >90 >60 mL/min/1.7m2 Final       Sex:  female     Wt Readings from Last 1 Encounters:   10/20/17 197 lb (89.4 kg)       Age:  47 year old     Creatinine   Date Value Ref Range Status   09/12/2012 0.61 0.52 - 1.04 mg/dL Final     Creatinine Clearance Calculator    Does this patient currently have kidney disease? (defined as Chronic Kidney Disease Stage 3,4 or 5 on the problem list or a GFR less than 60 ml/min if known)  No - Tamiflu (oseltamivir):  75 mg BID x 5 days    If further questions/concerns or if new symptoms develop, call your PCP or [facility] as soon as possible.      Provided home care instructions    General home care instruction:      Avoid contact with people in your household who are at increased risk for more severe complications of influenza (such as pregnant women or people who have a chronic health condition, for example diabetes, heart disease, asthma, or emphysema).    Stay home from work, school, childcare or other public places until your fever (37.8 degrees Celsius [100 degrees  Fahrenheit]) has been gone for at least 24 hours, except to seek medical care. (Fever should be gone without the use of fever-reducing medications.) Use a surgical mask if available, or cover your mouth and nose with a tissue if possible if you need to seek medical care. Contact your work place, school, or  as they may have longer exclusion times.    You may continue to shed virus after your fever is gone. Limit your contact with high-risk individuals for 10 days after your symptoms started and be especially careful to cover your coughs/sneezes and wash your hands.    Cover your cough and wash your hands often, and especially after coughing, sneezing, blowing your nose.    Drink plenty of fluids (such as water, broth, sports drinks, electrolyte beverages for children) to prevent dehydration.    Avoid tobacco and second hand smoke.    Get plenty of rest.    Use over-the-counter pain relievers as needed per  instructions.    Do not give aspirin (acetylsalicylic acid) or products that contain aspirin (e.g. bismuth subsalicylate - Pepto Bismol) to children or teenagers 18 years or younger.    A small number of people with influenza do not have fever. If you have respiratory symptoms and are at increased risk for complications of influenza, contact your health care provider to discuss these symptoms.    For parents of infants:    If possible, only family members who are not sick should care for infants.    Wash your hands with soap and water, or an alcohol-based hand rub (if your hands are not visibly soiled) before caring for your infant.    Cover your mouth and nose with a tissue when coughing or sneezing, and clean your hands.    Contact a health care provider to discuss your illness within 1-2 days if you are    Pregnant    Immunocompromised    Call 911 if you experience:    Difficulty breathing or shortness of breath    Pain or pressure in the chest    Confusion or less responsive than  normal    Seizure activity: ongoing or stopped    Severe dehydration or signs of shock     Blue or dusky lips, skin, or nail beds    If further questions/concerns or if new symptoms develop, call your PCP or [facility] as soon as possible.    When to seek medical attention    Contact your health care provider right away if you experience:    A painful sore throat accompanied by fever persists for more than 48 hours    Ear pain, sinus pain, persistent vomiting and/or diarrhea    Oral temperature greater than 104  Fahrenheit (40  Celsius)    Dehydration (e.g., mouth feeling dry, dizzy when sitting/standing, decreased urine output)    Severe or persistent vomiting; unable to keep fluids down    Improvement in flu-like symptoms (fever and cough or sore throat) but then return of fever and worse cough or sore throat    Not drinking enough fluid    Any other concerns not stated above      Additional educational resources include:    http://www.[employer].com    http://www.cdc.gov/flu/  Sarahi Bro RN   Tamiflu prescribed per [facility] protocol.

## 2018-01-26 NOTE — TELEPHONE ENCOUNTER
Reason for call: med question/flu  Patient called regarding (reason for call): prescription-tamiflu  Additional comments: symptoms of the flu    Phone number to reach patient:  Home number on file [phone] (home)    Best Time:  anytime    Can we leave a detailed message on this number?  YES

## 2018-03-23 ENCOUNTER — OFFICE VISIT (OUTPATIENT)
Dept: OBGYN | Facility: CLINIC | Age: 48
End: 2018-03-23
Payer: COMMERCIAL

## 2018-03-23 VITALS
BODY MASS INDEX: 35.57 KG/M2 | DIASTOLIC BLOOD PRESSURE: 101 MMHG | OXYGEN SATURATION: 96 % | SYSTOLIC BLOOD PRESSURE: 166 MMHG | HEART RATE: 86 BPM | WEIGHT: 204 LBS

## 2018-03-23 DIAGNOSIS — Z12.4 PAP SMEAR FOR CERVICAL CANCER SCREENING: ICD-10-CM

## 2018-03-23 DIAGNOSIS — Z72.0 TOBACCO USE: ICD-10-CM

## 2018-03-23 DIAGNOSIS — N93.9 ABNORMAL UTERINE BLEEDING: Primary | ICD-10-CM

## 2018-03-23 DIAGNOSIS — I10 HYPERTENSION GOAL BP (BLOOD PRESSURE) < 140/90: ICD-10-CM

## 2018-03-23 LAB
HGB BLD-MCNC: 13.5 G/DL (ref 11.7–15.7)
TSH SERPL DL<=0.005 MIU/L-ACNC: 1.12 MU/L (ref 0.4–4)

## 2018-03-23 PROCEDURE — 99204 OFFICE O/P NEW MOD 45 MIN: CPT | Mod: 25 | Performed by: OBSTETRICS & GYNECOLOGY

## 2018-03-23 PROCEDURE — 88305 TISSUE EXAM BY PATHOLOGIST: CPT | Performed by: OBSTETRICS & GYNECOLOGY

## 2018-03-23 PROCEDURE — 85018 HEMOGLOBIN: CPT | Performed by: OBSTETRICS & GYNECOLOGY

## 2018-03-23 PROCEDURE — 36415 COLL VENOUS BLD VENIPUNCTURE: CPT | Performed by: OBSTETRICS & GYNECOLOGY

## 2018-03-23 PROCEDURE — 87624 HPV HI-RISK TYP POOLED RSLT: CPT | Performed by: OBSTETRICS & GYNECOLOGY

## 2018-03-23 PROCEDURE — 84443 ASSAY THYROID STIM HORMONE: CPT | Performed by: OBSTETRICS & GYNECOLOGY

## 2018-03-23 PROCEDURE — 58100 BIOPSY OF UTERUS LINING: CPT | Performed by: OBSTETRICS & GYNECOLOGY

## 2018-03-23 PROCEDURE — G0145 SCR C/V CYTO,THINLAYER,RESCR: HCPCS | Performed by: OBSTETRICS & GYNECOLOGY

## 2018-03-23 NOTE — PROGRESS NOTES
"Elisabet is a 48 year old  here with complaint of abnormal uterine bleeding.    She had been on DepoProvera for about 14 years and she had her last injection in May, 2017.  She has had no bleeding until February, 10, 2018, and she has had it since.  It started out \"a lot\" and then it lightened and then it has increased again.  She has had some clots, little dime size clots.  These were at the beginning of the bleeding and she hasn't had any since.    No aggravating or alleviating factors.  She has tried a heating pad, but it was not of much benefit.    She has not had any associated dizziness, no pelvic pain or chest pain.   No shortness of breath.    She does not have any thyroid problems.     Past Medical History:   Diagnosis Date     Human papillomavirus in conditions classified elsewhere and of unspecified site     abnoraml pap-had Cryo     Hypertension goal BP (blood pressure) < 140/90      Obesity        Past Surgical History:   Procedure Laterality Date     CRYOTHERAPY, CERVICAL         Obstetric History       T0      L0     SAB1   TAB0   Ectopic0   Multiple0   Live Births0       # Outcome Date GA Lbr Justice/2nd Weight Sex Delivery Anes PTL Lv   1 SAB                   Gynecological History         Patient's last menstrual period was 02/10/2018 (approximate).     No STD/no PID/no IUD      see above HPI        Allergies   Allergen Reactions     Nkda [No Known Drug Allergies]         No current outpatient prescriptions on file.       Social History     Social History     Marital status:      Spouse name: N/A     Number of children: 0     Years of education: 16     Occupational History     accounting Other     Social History Main Topics     Smoking status: Light Tobacco Smoker     Packs/day: 0.10     Years: 20.00     Types: Cigarettes     Smokeless tobacco: Never Used      Comment: weekends     Alcohol use 1.2 oz/week     2 Standard drinks or equivalent per week     Drug use: No     " Sexual activity: Yes     Partners: Male     Other Topics Concern      Service No     Blood Transfusions No     Caffeine Concern No     Occupational Exposure No     Hobby Hazards No     Sleep Concern No     Stress Concern No     Weight Concern No     Special Diet No     Back Care No     Exercise No     Bike Helmet No     Seat Belt Yes     Self-Exams Yes     Social History Narrative       Family History   Problem Relation Age of Onset     C.A.D. Mother 63     Hypertension Father      CANCER Father      bladder     C.A.D. Maternal Grandmother      C.A.D. Maternal Aunt      C.A.D. Maternal Uncle      Breast Cancer No family hx of      Cancer - colorectal No family hx of      DIABETES No family hx of          Review of Systems:  10 point ROS of systems including Constitutional, Eyes, Respiratory, Cardiovascular, Gastroenterology, Genitourinary, Integumentary, Muscularskeletal, Psychiatric were all negative except for pertinent positives noted in my HPI and in the PMH.          EXAM:  BP (!) 166/101 (BP Location: Right arm, Cuff Size: Adult Large)  Pulse 86  Wt 204 lb (92.5 kg)  LMP 02/10/2018 (Approximate)  SpO2 96%  BMI 35.57 kg/m2  Body mass index is 35.57 kg/(m^2).  General Appearance:  healthy, alert, active, no distress  Skin:  Normal skin turgor  Neuro:  Alert, cranial nerves grossly intact  HEENT: NCAT  Neck:  No masses or lesions carotids are +2/4. No bruits heard  Lungs:  Good respiratory effort   Abdomen: Soft, nontender.  Normal bowel sounds.  No masses  Vulva: No external lesions, normal hair distribution, no adenopathy  BUS:  Normal, no masses noted  Urethral meatus:  No masses noted  Urethra:  No hypermobility  Vagina: Moist, pink, no abnormal discharge, well rugated, no lesions  Cervix: Smooth, pink, no visible lesions  Uterus: Normal size, anteverted, non-tender, mobile  Ovaries: No mass, non-tender, mobile  Extremities:  No clubbing, cyanosis or edema.        ASSESSMENT:  Abnormal uterine  bleeding   Hypertension  Smoking     PLAN:  - Check TSH, CBC  We discussed the bleeding profiles as people age and approach menopause.  Even though menstrual changes and irregularities are common and expected, they may also indicate or precipitate endometrial abnormalities.   The patient and I discussed the options for evaluation.  The EMB, SIS and the D&C were reviewed with her.  The EMB will not remove a polyp, but will give a tissue sample.  The SIS will further evaluate the lining, but no tissue sample, and should she have a suspected polyp, it would not be removed.  The D&C is more expensive but is currently the gold standard.    She desires to have the EMB performed.   Together we reviewed the risks and benefits of medical versus surgical therapy.  Medical therapy reviewed included hormonal manipulation with OCP's, Patch, Ring, Depo, or IUD.  Her medical options are limited due to the Hypertension and the tobacco use.  She is gradually decreasing the tobacco use.   We reviewed endometrial ablation versus hysterectomy.  Discussed that endometrial ablation is minimally invasive compared to hysterectomy but may not be definitive.  She will think about the options and decide once the results are back.      Alok Maxwell MD      PROCEDURE NOTE:  The procedure was reviewed with patient.  After consenting to the procedure she was placed into the dorsal lithotomy position.  The examination was performed.  The speculum was placed into the vagina.  The cervix was prepped with Betadine.  The anterior lip of the cervix was grasped with the Allis tenaculum.  The uterus was sounded to 8 cm.  The Pipelle was used to sample the endometrium.    Instruments were removed and the specimen was sent to pathology.  Results to the patient in 1-2 weeks when they are returned.    Alok Maxwell MD

## 2018-03-23 NOTE — NURSING NOTE
"Chief Complaint   Patient presents with     Abnormal Uterine Bleeding       Initial BP (!) 166/101 (BP Location: Right arm, Cuff Size: Adult Large)  Pulse 86  Wt 204 lb (92.5 kg)  LMP 02/10/2018 (Approximate)  SpO2 96%  BMI 35.57 kg/m2 Estimated body mass index is 35.57 kg/(m^2) as calculated from the following:    Height as of 5/17/17: 5' 3.5\" (1.613 m).    Weight as of this encounter: 204 lb (92.5 kg).  Medication Reconciliation: complete   MARKY Dietrich 3/23/2018         "

## 2018-03-23 NOTE — MR AVS SNAPSHOT
"              After Visit Summary   3/23/2018    Elisabet Dominguez    MRN: [id number]           Patient Information     Date Of Birth          1970        Visit Information        Provider Department      3/23/2018 11:00 AM Alok Maxwell MD [facility]        Today's Diagnoses     Abnormal uterine bleeding    -  1    Pap smear for cervical cancer screening        Hypertension goal BP (blood pressure) < 140/90        Tobacco use           Follow-ups after your visit        Follow-up notes from your care team     Return if symptoms worsen or fail to improve.      Who to contact     If you have questions or need follow up information about today's clinic visit or your schedule please contact [facility] directly at [phone].  Normal or non-critical lab and imaging results will be communicated to you by MyChart, letter or phone within 4 business days after the clinic has received the results. If you do not hear from us within 7 days, please contact the clinic through [employer] or phone. If you have a critical or abnormal lab result, we will notify you by phone as soon as possible.  Submit refill requests through [employer] or call your pharmacy and they will forward the refill request to us. Please allow 3 business days for your refill to be completed.          Additional Information About Your Visit        MyChart Information     [employer] lets you send messages to your doctor, view your test results, renew your prescriptions, schedule appointments and more. To sign up, go to [email or web address] . Click on \"Log in\" on the left side of the screen, which will take you to the Welcome page. Then click on \"Sign up Now\" on the right side of the page.     You will be asked to enter the access code listed below, as well as some personal information. Please follow the directions to create your username and password.     Your access code is: [email or web address]  Expires: 6/21/2018 12:00 PM     Your " access code will  in 90 days. If you need help or a new code, please call your [place] clinic or [phone].        Care EveryWhere ID     This is your Care EveryWhere ID. This could be used by other organizations to access your [place] medical records  [id number]        Your Vitals Were     Pulse Last Period Pulse Oximetry BMI (Body Mass Index)          86 02/10/2018 (Approximate) 96% 35.57 kg/m2         Blood Pressure from Last 3 Encounters:   18 (!) 166/101   10/20/17 136/78   17 146/90    Weight from Last 3 Encounters:   18 204 lb (92.5 kg)   10/20/17 197 lb (89.4 kg)   17 199 lb 12.8 oz (90.6 kg)              We Performed the Following     ENDOMETRIAL BIOPSY W/O CERVICAL DILATION     Hemoglobin     HPV High Risk Types DNA Cervical     Pap imaged thin layer screen with HPV - recommended age 30 - 65 years (select HPV order below)     Surgical pathology exam     TSH with free T4 reflex          Today's Medication Changes          These changes are accurate as of 3/23/18 12:00 PM.  If you have any questions, ask your nurse or doctor.               Stop taking these medicines if you haven't already. Please contact your care team if you have questions.     medroxyPROGESTERone 150 MG/ML injection   Commonly known as:  DEPO-PROVERA   Stopped by:  Alok Maxwell MD                    Primary Care Provider Office Phone # Fax #    Chloé Wilder [phone]       [address] [address]        Equal Access to Services     [facility] AH: Hadii constantino ku hadasho Soomaali, waaxda luqadaha, qaybta kaalmada ry, wax honey lozada. So [facility] [phone].    ATENCIÓN: Si habla español, tiene a landa disposición servicios gratuitos de asistencia lingüística. Llame al [phone].    We comply with applicable federal civil rights laws and [place] laws. We do not discriminate on the basis of race, color, national origin, age,  disability, sex, sexual orientation, or gender identity.            Thank you!     Thank you for choosing [facility] [place]  for your care. Our goal is always to provide you with excellent care. Hearing back from our patients is one way we can continue to improve our services. Please take a few minutes to complete the written survey that you may receive in the mail after your visit with us. Thank you!             Your Updated Medication List - Protect others around you: Learn how to safely use, store and throw away your medicines at www.disposemymeds.org.      Notice  As of 3/23/2018 12:00 PM    You have not been prescribed any medications.

## 2018-03-23 NOTE — LETTER
April 3, 2018    Elisabet Dominguez  [address]   [place] [address]    Dear Elisabet,  We are happy to inform you that your PAP smear result from 3/23/18 is normal.  We are now able to do a follow up test on PAP smears. The DNA test is for HPV (Human Papilloma Virus). Cervical cancer is closely linked with certain types of HPV. Your result showed no evidence of high risk HPV.  Therefore we recommend you return in 3 years for your next pap smear.  You will still need to return to the clinic every year for an annual exam and other preventive tests.  Please contact the clinic at [phone] with any questions.  Sincerely,    Alok Maxwell MD/obdulia

## 2018-03-27 LAB
COPATH REPORT: NORMAL
PAP: NORMAL

## 2018-03-28 LAB — COPATH REPORT: NORMAL

## 2018-03-29 LAB
FINAL DIAGNOSIS: NORMAL
HPV HR 12 DNA CVX QL NAA+PROBE: NEGATIVE
HPV16 DNA SPEC QL NAA+PROBE: NEGATIVE
HPV18 DNA SPEC QL NAA+PROBE: NEGATIVE
SPECIMEN DESCRIPTION: NORMAL
SPECIMEN SOURCE CVX/VAG CYTO: NORMAL

## 2018-04-03 ENCOUNTER — TELEPHONE (OUTPATIENT)
Dept: OBGYN | Facility: CLINIC | Age: 48
End: 2018-04-03

## 2018-04-03 NOTE — TELEPHONE ENCOUNTER
Return call from pt. Gave results and orders per Dr. Maxwell as below. Pt verbalized understanding and agreed to follow plan. Patient agreed to schedule a f/u appt with Dr. Maxwell   on 04-25-18 at 0800. Patient was very appreciative of assistance. Paloma Zelaya RN, BAN

## 2018-04-03 NOTE — TELEPHONE ENCOUNTER
Notes Recorded by Alok Maxwell MD on 3/30/2018 at 11:17 AM  Ok to notify patient the labs and the emb results look good.   The biopsy result shows evidence of dysfunctional endometrium.  She may consider medical management for the bleeding.   She might need another appointment to discuss further.     Left message asking pt to call back to clinic for information from Dr. Maxwell. Left clinic call back phone number and RN hours. Paloma Zelaya RN, BAN

## 2018-04-25 ENCOUNTER — OFFICE VISIT (OUTPATIENT)
Dept: OBGYN | Facility: CLINIC | Age: 48
End: 2018-04-25
Payer: COMMERCIAL

## 2018-04-25 VITALS
BODY MASS INDEX: 35.61 KG/M2 | OXYGEN SATURATION: 95 % | SYSTOLIC BLOOD PRESSURE: 163 MMHG | DIASTOLIC BLOOD PRESSURE: 97 MMHG | WEIGHT: 204.2 LBS | HEART RATE: 85 BPM

## 2018-04-25 DIAGNOSIS — I10 HYPERTENSION GOAL BP (BLOOD PRESSURE) < 140/90: ICD-10-CM

## 2018-04-25 DIAGNOSIS — N93.8 DYSFUNCTIONAL UTERINE BLEEDING: Primary | ICD-10-CM

## 2018-04-25 LAB — BETA HCG QUAL IFA URINE: NEGATIVE

## 2018-04-25 PROCEDURE — 84703 CHORIONIC GONADOTROPIN ASSAY: CPT | Performed by: OBSTETRICS & GYNECOLOGY

## 2018-04-25 PROCEDURE — 99213 OFFICE O/P EST LOW 20 MIN: CPT | Performed by: OBSTETRICS & GYNECOLOGY

## 2018-04-25 PROCEDURE — 96372 THER/PROPH/DIAG INJ SC/IM: CPT | Performed by: OBSTETRICS & GYNECOLOGY

## 2018-04-25 RX ORDER — MEDROXYPROGESTERONE ACETATE 150 MG/ML
150 INJECTION, SUSPENSION INTRAMUSCULAR
Qty: 1 ML | Refills: 0 | OUTPATIENT
Start: 2018-04-25 | End: 2018-07-02

## 2018-04-25 NOTE — NURSING NOTE
"Chief Complaint   Patient presents with     RECHECK     follow up from 3/23/18 visit and EMB results       Initial BP (!) 163/97 (BP Location: Right arm, Cuff Size: Adult Large)  Pulse 85  Wt 204 lb 3.2 oz (92.6 kg)  LMP 04/15/2018  SpO2 95%  BMI 35.61 kg/m2 Estimated body mass index is 35.61 kg/(m^2) as calculated from the following:    Height as of 5/17/17: 5' 3.5\" (1.613 m).    Weight as of this encounter: 204 lb 3.2 oz (92.6 kg).  Medication Reconciliation: complete   MARKY Dietrich 4/25/2018         "

## 2018-04-25 NOTE — PROGRESS NOTES
"Elisabet is a 48 year old  here for follow up of abnormal uterine bleeding.    She had used Depo Provera for about 14 years, with her last injection in May, 2017.  She had no bleeding until February, 10, 2018.   It started out \"a lot\" and then it lightened and then it has increased again.  She has had some clots, little dime size clots. These were at the beginning of the bleeding and she hasn't had any since.    No aggravating or alleviating factors.  She has tried a heating pad, but it was not of much benefit.    She has not had any associated dizziness, no pelvic pain or chest pain.   No shortness of breath.    She does not have any thyroid problems.     She had the endometrial biopsy and the results below are reviewed with her.   Specimen #: [id number]   Collected: 3/23/2018   Received: 3/26/2018   Reported: 3/28/2018 13:10   Ordering Phy(s): TYSON IBARRA     For improved result formatting, select 'View Enhanced Report Format' under    Linked Documents section.     SPECIMEN(S):   Endometrial biopsy     FINAL DIAGNOSIS:   Endometrial biopsy:   - No diagnostic abnormalities identified, fragments of benign non-phasic   inactive endometrium without atypia   with features of dysfunctional endometrial bleeding.     Component      Latest Ref Rng & Units 3/23/2018   Hemoglobin      11.7 - 15.7 g/dL 13.5   TSH      0.40 - 4.00 mU/L 1.12         Past Medical History:   Diagnosis Date     Human papillomavirus in conditions classified elsewhere and of unspecified site     abnoraml pap-had Cryo     Hypertension goal BP (blood pressure) < 140/90      Obesity        Past Surgical History:   Procedure Laterality Date     CRYOTHERAPY, CERVICAL          Allergies   Allergen Reactions     Nkda [No Known Drug Allergies]        No current outpatient prescriptions on file.       Social History     Social History     Marital status:      Spouse name: N/A     Number of children: 0     Years of education: 16 "     Occupational History     accounting Other     Social History Main Topics     Smoking status: Light Tobacco Smoker     Packs/day: 0.10     Years: 20.00     Types: Cigarettes     Smokeless tobacco: Never Used      Comment: weekends     Alcohol use 1.2 oz/week     2 Standard drinks or equivalent per week     Drug use: No     Sexual activity: Yes     Partners: Male     Other Topics Concern      Service No     Blood Transfusions No     Caffeine Concern No     Occupational Exposure No     Hobby Hazards No     Sleep Concern No     Stress Concern No     Weight Concern No     Special Diet No     Back Care No     Exercise No     Bike Helmet No     Seat Belt Yes     Self-Exams Yes     Social History Narrative       Family History   Problem Relation Age of Onset     C.A.D. Mother 63     Hypertension Father      CANCER Father      bladder     C.A.D. Maternal Grandmother      C.A.D. Maternal Aunt      C.A.D. Maternal Uncle      Breast Cancer No family hx of      Cancer - colorectal No family hx of      DIABETES No family hx of        Review of Systems:  10 point ROS of systems including Constitutional, Eyes, Respiratory, Cardiovascular, Gastroenterology, Genitourinary, Integumentary, Muscularskeletal, Psychiatric were all negative except for pertinent positives noted in my HPI and in the PMH.      Exam  BP (!) 163/97 (BP Location: Right arm, Cuff Size: Adult Large)  Pulse 85  Wt 204 lb 3.2 oz (92.6 kg)  LMP 04/15/2018  SpO2 95%  BMI 35.61 kg/m2  General:  WNWD female, NAD  Alert  Oriented x 3  Gait:  Normal  Skin:  Normal skin turgor  HEENT:  NC/AT, EOMI  Lungs:  Good respiratory effort   Pelvic exam:  Declined   Extremities:  No clubbing, no cyanosis and no edema.        Assessment  Abnormal uterine bleeding   hypertension    Plan  We reviewed the options available.  Due to her blood pressure elevation, the options are limited to progestin only options (IUD, Depo Provera, progestin only OCPs).   She would like  to use the Depo Provera.  Prescription for one dose ordered, so refills will be sent to me and I will attempt to look at the blood pressure management.   I recommend to see FP regarding her blood pressure.  She did not think she could make an appointment today around noon, with one of the physicians.  She will make an appointment once she has her calendar.    Questions pham to be answered.     Alok Maxwell MD

## 2018-04-25 NOTE — NURSING NOTE
BP: 163/97    LAST PAP/EXAM:   Lab Results   Component Value Date    PAP NIL 03/23/2018     URINE HCG:negative    The following medication was given:     MEDICATION: Depo Provera 150mg  ROUTE: IM  SITE: RUQ - Gluteus  : [employer]  LOT #: [id number]  EXP:6/2019  NEXT INJECTION DUE: 7/11/18 - 7/25/18   Provider: JOSLYN Dietrich MA 4/25/2018

## 2018-04-25 NOTE — MR AVS SNAPSHOT
"              After Visit Summary   2018    Elisabet Dominguez    MRN: [id number]           Patient Information     Date Of Birth          1970        Visit Information        Provider Department      2018 8:00 AM Alok Maxwell MD [facility] [place]        Today's Diagnoses     Dysfunctional uterine bleeding    -  1    Hypertension goal BP (blood pressure) < 140/90           Follow-ups after your visit        Who to contact     If you have questions or need follow up information about today's clinic visit or your schedule please contact [facility] directly at [phone].  Normal or non-critical lab and imaging results will be communicated to you by [employer], letter or phone within 4 business days after the clinic has received the results. If you do not hear from us within 7 days, please contact the clinic through [employer] or phone. If you have a critical or abnormal lab result, we will notify you by phone as soon as possible.  Submit refill requests through [facility] [employer] or call your pharmacy and they will forward the refill request to us. Please allow 3 business days for your refill to be completed.          Additional Information About Your Visit        MyChart Information     [facility] lets you send messages to your doctor, view your test results, renew your prescriptions, schedule appointments and more. To sign up, go to [email or web address] . Click on \"Log in\" on the left side of the screen, which will take you to the Welcome page. Then click on \"Sign up Now\" on the right side of the page.     You will be asked to enter the access code listed below, as well as some personal information. Please follow the directions to create your username and password.     Your access code is: [email or web address]  Expires: 2018 12:00 PM     Your access code will  in 90 days. If you need help or a new code, please call your [facility] or [phone].        Care EveryWhere ID     This " is your Care EveryWhere ID. This could be used by other organizations to access your [place] medical records  [id number]        Your Vitals Were     Pulse Last Period Pulse Oximetry BMI (Body Mass Index)          85 04/15/2018 95% 35.61 kg/m2         Blood Pressure from Last 3 Encounters:   04/25/18 (!) 163/97   03/23/18 (!) 166/101   10/20/17 136/78    Weight from Last 3 Encounters:   04/25/18 204 lb 3.2 oz (92.6 kg)   03/23/18 204 lb (92.5 kg)   10/20/17 197 lb (89.4 kg)              We Performed the Following     Beta HCG qual IFA urine          Today's Medication Changes          These changes are accurate as of 4/25/18  8:41 AM.  If you have any questions, ask your nurse or doctor.               Start taking these medicines.        Dose/Directions    medroxyPROGESTERone 150 MG/ML injection   Commonly known as:  DEPO-PROVERA   Used for:  Dysfunctional uterine bleeding   Started by:  Alok Maxwell MD        Dose:  150 mg   Inject 1 mL (150 mg) into the muscle every 3 months   Quantity:  1 mL   Refills:  0            Where to get your medicines      Some of these will need a paper prescription and others can be bought over the counter.  Ask your nurse if you have questions.     You don't need a prescription for these medications     medroxyPROGESTERone 150 MG/ML injection                Primary Care Provider Office Phone # Fax #    Chloé Wilder [phone]       [address] [place] [id number]        Equal Access to Services     INDER DAS AH: Hadii constantino knappo Sotarun, waaxda luqadaha, qaybta kaalmada mendyporfirio, waxalecia flynnmeredith lozada. So [facility] [phone].    ATENCIÓN: Si habla español, tiene a landa disposición servicios gratuitos de asistencia lingüística. Llame al [phone].    We comply with applicable federal civil rights laws and [place] laws. We do not discriminate on the basis of race, color, national origin, age, disability, sex, sexual  orientation, or gender identity.            Thank you!     Thank you for choosing [facility] [place]  for your care. Our goal is always to provide you with excellent care. Hearing back from our patients is one way we can continue to improve our services. Please take a few minutes to complete the written survey that you may receive in the mail after your visit with us. Thank you!             Your Updated Medication List - Protect others around you: Learn how to safely use, store and throw away your medicines at www.disposemymeds.org.          This list is accurate as of 4/25/18  8:41 AM.  Always use your most recent med list.                   Brand Name Dispense Instructions for use Diagnosis    medroxyPROGESTERone 150 MG/ML injection    DEPO-PROVERA    1 mL    Inject 1 mL (150 mg) into the muscle every 3 months    Dysfunctional uterine bleeding

## 2018-06-07 ENCOUNTER — RADIANT APPOINTMENT (OUTPATIENT)
Dept: MAMMOGRAPHY | Facility: CLINIC | Age: 48
End: 2018-06-07
Attending: FAMILY MEDICINE
Payer: COMMERCIAL

## 2018-06-07 ENCOUNTER — OFFICE VISIT (OUTPATIENT)
Dept: FAMILY MEDICINE | Facility: CLINIC | Age: 48
End: 2018-06-07
Payer: COMMERCIAL

## 2018-06-07 VITALS
HEART RATE: 92 BPM | HEIGHT: 63 IN | BODY MASS INDEX: 35.72 KG/M2 | WEIGHT: 201.6 LBS | DIASTOLIC BLOOD PRESSURE: 88 MMHG | TEMPERATURE: 98.1 F | RESPIRATION RATE: 18 BRPM | OXYGEN SATURATION: 92 % | SYSTOLIC BLOOD PRESSURE: 140 MMHG

## 2018-06-07 DIAGNOSIS — E66.01 MORBID OBESITY DUE TO EXCESS CALORIES (H): ICD-10-CM

## 2018-06-07 DIAGNOSIS — Z12.31 VISIT FOR SCREENING MAMMOGRAM: ICD-10-CM

## 2018-06-07 DIAGNOSIS — Z11.4 SCREENING FOR HIV (HUMAN IMMUNODEFICIENCY VIRUS): ICD-10-CM

## 2018-06-07 DIAGNOSIS — I10 BENIGN ESSENTIAL HYPERTENSION: Primary | ICD-10-CM

## 2018-06-07 LAB
ALBUMIN UR-MCNC: NEGATIVE MG/DL
ANION GAP SERPL CALCULATED.3IONS-SCNC: 11 MMOL/L (ref 3–14)
APPEARANCE UR: CLEAR
BILIRUB UR QL STRIP: NEGATIVE
BUN SERPL-MCNC: 9 MG/DL (ref 7–30)
CALCIUM SERPL-MCNC: 8.9 MG/DL (ref 8.5–10.1)
CHLORIDE SERPL-SCNC: 108 MMOL/L (ref 94–109)
CHOLEST SERPL-MCNC: 172 MG/DL
CO2 SERPL-SCNC: 22 MMOL/L (ref 20–32)
COLOR UR AUTO: YELLOW
CREAT SERPL-MCNC: 0.62 MG/DL (ref 0.52–1.04)
GFR SERPL CREATININE-BSD FRML MDRD: >90 ML/MIN/1.7M2
GLUCOSE SERPL-MCNC: 92 MG/DL (ref 70–99)
GLUCOSE UR STRIP-MCNC: NEGATIVE MG/DL
HDLC SERPL-MCNC: 50 MG/DL
HGB UR QL STRIP: NEGATIVE
HIV 1+2 AB+HIV1 P24 AG SERPL QL IA: NONREACTIVE
KETONES UR STRIP-MCNC: NEGATIVE MG/DL
LDLC SERPL CALC-MCNC: 56 MG/DL
LEUKOCYTE ESTERASE UR QL STRIP: NEGATIVE
NITRATE UR QL: NEGATIVE
NONHDLC SERPL-MCNC: 122 MG/DL
PH UR STRIP: 5.5 PH (ref 5–7)
POTASSIUM SERPL-SCNC: 3.9 MMOL/L (ref 3.4–5.3)
SODIUM SERPL-SCNC: 141 MMOL/L (ref 133–144)
SOURCE: NORMAL
SP GR UR STRIP: >1.03 (ref 1–1.03)
TRIGL SERPL-MCNC: 329 MG/DL
TSH SERPL DL<=0.005 MIU/L-ACNC: 0.73 MU/L (ref 0.4–4)
UROBILINOGEN UR STRIP-ACNC: 0.2 EU/DL (ref 0.2–1)

## 2018-06-07 PROCEDURE — 84443 ASSAY THYROID STIM HORMONE: CPT | Performed by: FAMILY MEDICINE

## 2018-06-07 PROCEDURE — 80048 BASIC METABOLIC PNL TOTAL CA: CPT | Performed by: FAMILY MEDICINE

## 2018-06-07 PROCEDURE — 87389 HIV-1 AG W/HIV-1&-2 AB AG IA: CPT | Performed by: FAMILY MEDICINE

## 2018-06-07 PROCEDURE — 36415 COLL VENOUS BLD VENIPUNCTURE: CPT | Performed by: FAMILY MEDICINE

## 2018-06-07 PROCEDURE — 99213 OFFICE O/P EST LOW 20 MIN: CPT | Performed by: FAMILY MEDICINE

## 2018-06-07 PROCEDURE — 81003 URINALYSIS AUTO W/O SCOPE: CPT | Performed by: FAMILY MEDICINE

## 2018-06-07 PROCEDURE — 77067 SCR MAMMO BI INCL CAD: CPT | Mod: TC

## 2018-06-07 PROCEDURE — 80061 LIPID PANEL: CPT | Performed by: FAMILY MEDICINE

## 2018-06-07 RX ORDER — LISINOPRIL 10 MG/1
10 TABLET ORAL DAILY
Qty: 30 TABLET | Refills: 1 | Status: SHIPPED | OUTPATIENT
Start: 2018-06-07 | End: 2018-06-28

## 2018-06-07 ASSESSMENT — PAIN SCALES - GENERAL: PAINLEVEL: NO PAIN (0)

## 2018-06-07 NOTE — LETTER
[facility]  [address] [place] [address]    June 7, 2018    Elisabet Dominguez  [address]   [place] [address]          Dear Elisabet,  Normal Urine   Normal Cholesterol   Your Triglycerides are high. I am mailing you a Diet.Please eat less sugar containing foods,alcohol,pasta,rice ,crackers,white rice  and fats. Exercise about 30-45 minutes at least 5 times / week .   Thyroid is normal   Take care   Enclosed is a copy of your results.     Results for orders placed or performed in visit on 06/07/18   Basic metabolic panel   Result Value Ref Range    Sodium 141 133 - 144 mmol/L    Potassium 3.9 3.4 - 5.3 mmol/L    Chloride 108 94 - 109 mmol/L    Carbon Dioxide 22 20 - 32 mmol/L    Anion Gap 11 3 - 14 mmol/L    Glucose 92 70 - 99 mg/dL    Urea Nitrogen 9 7 - 30 mg/dL    Creatinine 0.62 0.52 - 1.04 mg/dL    GFR Estimate >90 >60 mL/min/1.7m2    GFR Estimate If Black >90 >60 mL/min/1.7m2    Calcium 8.9 8.5 - 10.1 mg/dL   Lipid panel reflex to direct LDL Fasting   Result Value Ref Range    Cholesterol 172 <200 mg/dL    Triglycerides 329 (H) <150 mg/dL    HDL Cholesterol 50 >49 mg/dL    LDL Cholesterol Calculated 56 <100 mg/dL    Non HDL Cholesterol 122 <130 mg/dL   TSH with free T4 reflex   Result Value Ref Range    TSH 0.73 0.40 - 4.00 mU/L   *UA reflex to Microscopic and Culture ([place] and [facility] (except [place] and [facility])   Result Value Ref Range    Color Urine Yellow     Appearance Urine Clear     Glucose Urine Negative NEG^Negative mg/dL    Bilirubin Urine Negative NEG^Negative    Ketones Urine Negative NEG^Negative mg/dL    Specific Gravity Urine >1.030 1.003 - 1.035    Blood Urine Negative NEG^Negative    pH Urine 5.5 5.0 - 7.0 pH    Protein Albumin Urine Negative NEG^Negative mg/dL    Urobilinogen Urine 0.2 0.2 - 1.0 EU/dL    Nitrite Urine Negative NEG^Negative    Leukocyte Esterase Urine Negative  NEG^Negative    Source Midstream Urine    HIV Antigen Antibody Combo   Result Value Ref Range    HIV Antigen Antibody Combo Nonreactive NR^Nonreactive           If you have any questions or concerns, please call myself or my nurse at [phone].      Sincerely,        Chloé Wilder MD/pb

## 2018-06-07 NOTE — MR AVS SNAPSHOT
After Visit Summary   6/7/2018    Elisabet Dominguez    MRN: [id number]           Patient Information     Date Of Birth          1970        Visit Information        Provider Department      6/7/2018 7:20 AM Chloé Wilder MD [facility]        Today's Diagnoses     Benign essential hypertension    -  1    Visit for screening mammogram        Screening for HIV (human immunodeficiency virus)        Encounter for screening for HIV          Care Instructions      Lisinopril Oral tablet  What is this medicine?  LISINOPRIL (lyse IN oh pril) is an ACE inhibitor. This medicine is used to treat high blood pressure and heart failure. It is also used to protect the heart immediately after a heart attack.  This medicine may be used for other purposes; ask your health care provider or pharmacist if you have questions.  What should I tell my health care provider before I take this medicine?  They need to know if you have any of these conditions:    diabetes    heart or blood vessel disease    immune system disease like lupus or scleroderma    kidney disease    low blood pressure    previous swelling of the tongue, face, or lips with difficulty breathing, difficulty swallowing, hoarseness, or tightening of the throat    an unusual or allergic reaction to lisinopril, other ACE inhibitors, insect venom, foods, dyes, or preservatives    pregnant or trying to get pregnant    breast-feeding  How should I use this medicine?  Take this medicine by mouth with a glass of water. Follow the directions on your prescription label. You may take this medicine with or without food. Take your medicine at regular intervals. Do not stop taking this medicine except on the advice of your doctor or health care professional.  Talk to your pediatrician regarding the use of this medicine in children. Special care may be needed. While this drug may be prescribed for children as young as 6 years of age for selected conditions,  precautions do apply.  Overdosage: If you think you have taken too much of this medicine contact a poison control center or emergency room at once.  NOTE: This medicine is only for you. Do not share this medicine with others.  What if I miss a dose?  If you miss a dose, take it as soon as you can. If it is almost time for your next dose, take only that dose. Do not take double or extra doses.  What may interact with this medicine?    diuretics    lithium    NSAIDs, medicines for pain and inflammation, like ibuprofen or naproxen    over-the-counter herbal supplements like hawthorn    potassium salts or potassium supplements    salt substitutes  This list may not describe all possible interactions. Give your health care provider a list of all the medicines, herbs, non-prescription drugs, or dietary supplements you use. Also tell them if you smoke, drink alcohol, or use illegal drugs. Some items may interact with your medicine.  What should I watch for while using this medicine?  Visit your doctor or health care professional for regular check ups. Check your blood pressure as directed. Ask your doctor what your blood pressure should be, and when you should contact him or her. Call your doctor or health care professional if you notice an irregular or fast heart beat.  Women should inform their doctor if they wish to become pregnant or think they might be pregnant. There is a potential for serious side effects to an unborn child. Talk to your health care professional or pharmacist for more information.  Check with your doctor or health care professional if you get an attack of severe diarrhea, nausea and vomiting, or if you sweat a lot. The loss of too much body fluid can make it dangerous for you to take this medicine.  You may get drowsy or dizzy. Do not drive, use machinery, or do anything that needs mental alertness until you know how this drug affects you. Do not stand or sit up quickly, especially if you are an  older patient. This reduces the risk of dizzy or fainting spells. Alcohol can make you more drowsy and dizzy. Avoid alcoholic drinks.  Avoid salt substitutes unless you are told otherwise by your doctor or health care professional.  Do not treat yourself for coughs, colds, or pain while you are taking this medicine without asking your doctor or health care professional for advice. Some ingredients may increase your blood pressure.  What side effects may I notice from receiving this medicine?  Side effects that you should report to your doctor or health care professional as soon as possible:    abdominal pain with or without nausea or vomiting    allergic reactions like skin rash or hives, swelling of the hands, feet, face, lips, throat, or tongue    dark urine    difficulty breathing    dizzy, lightheaded or fainting spell    fever or sore throat    irregular heart beat, chest pain    pain or difficulty passing urine    redness, blistering, peeling or loosening of the skin, including inside the mouth    unusually weak    yellowing of the eyes or skin  Side effects that usually do not require medical attention (report to your doctor or health care professional if they continue or are bothersome):    change in taste    cough    decreased sexual function or desire    headache    sun sensitivity    tiredness  This list may not describe all possible side effects. Call your doctor for medical advice about side effects. You may report side effects to FDA at 3-322-FDA-8540.  Where should I keep my medicine?  Keep out of the reach of children.  Store at room temperature between 15 and 30 degrees C (59 and 86 degrees F). Protect from moisture. Keep container tightly closed. Throw away any unused medicine after the expiration date.  NOTE:This sheet is a summary. It may not cover all possible information. If you have questions about this medicine, talk to your doctor, pharmacist, or health care provider. Copyright  2016 [employer]        High Blood Pressure, New, Begin Treatment  Your blood pressure was high enough today to start treatment with medicines. Often healthcare providers don t know what causes high blood pressure (hypertension). But it can be controlled with lifestyle changes and medicines. High blood pressure usually has no symptoms. But it can sometimes cause headache, dizziness, blurred vision, a rushing sound in your ears, chest pain, or shortness of breath. But even without symptoms, high blood pressure that s not treated raises your risk for heart attack, heart failure, and stroke. High blood pressure is a serious health risk and shouldn t be ignored.    Blood pressure measurements are given as 2 numbers. Systolic blood pressure is the upper number. This is the pressure when the heart contracts. Diastolic blood pressure is the lower number. This is the pressure when the heart relaxes between beats. You will see your blood pressure readings written together. For example, a person with a systolic pressure of 118 and a diastolic pressure of 78 will have 118/78 written in the medical record.   Blood pressure is categorized as normal, elevated, or stage 1 or stage 2 high blood pressure:    Normal blood pressure is systolic of less than 120 and diastolic of less than 80 (120/80)    Elevated blood pressure is systolic of 120 to 129 and diastolic less than 80    Stage 1 high blood pressure is systolic is 130 to 139 or diastolic between 80 to 89    Stage 2 high blood pressure is when systolic is 140 or higher or the diastolic is 90 or higher  Home care  If you have high blood pressure, you should do what is listed below to lower your blood pressure. If you are taking medicines for high blood pressure, these methods may reduce or end your need for medicines in the future.    Begin a weight-loss program if you are overweight.    Cut back on how much salt you get in your diet. Here s how to do this:  ? Don t eat foods that have  a lot of salt. These include olives, pickles, smoked meats, and salted potato chips.  ? Don t add salt to your food at the table.  ? Use only small amounts of salt when cooking.  ? Review food labels to track how much salt is in prepared foods.  ? When eating out, ask that no additional salt be added to your food order.    Begin an exercise program. Talk with your healthcare provider about the type of exercise program that would be best for you. It doesn't have to be hard. Even brisk walking for 20 minutes 3 times a week is a good form of exercise.    Don t take medicines that have heart stimulants. This includes many over-the-counter cold and sinus decongestant pills and sprays, as well as diet pills. Check the warnings about high blood pressure on the label. Before purchasing any over-the-counter medicines or supplements, always ask the pharmacist about the product's potential interaction with your high blood pressure and your high blood pressure medicines.    Stimulants such as amphetamine or cocaine could be lethal for someone with high blood pressure. Never take these.    Limit how much caffeine you get in your diet. Switch to caffeine-free products.    Stop smoking. If you are a long-time smoker, this can be hard. Enroll in a stop-smoking program to make it more likely that you will quit for good.    Learn how to handle stress. This is an important part of any program to lower blood pressure. Learn about relaxation methods like meditation, yoga, or biofeedback.    If your provider prescribed medicines, take them exactly as directed. Missing doses may cause your blood pressure get out of control.    If you miss a dose or doses, check with your healthcare provider or pharmacist about what to do.    Consider buying an automatic blood pressure machine. Your provider can make a recommendation. You can get one of these at most pharmacies.  The American Heart Association recommends the following guidelines for home  blood pressure monitoring:    Don't smoke or drink coffee or other caffeinated drinks for 30 minutes before taking your blood pressure.    Go to the bathroom before the test.    Relax for 5 minutes before taking the measurement.    Sit with your back supported (don't sit on a couch or soft chair); keep your feet on the floor uncrossed. Place your arm on a solid flat surface (like a table) with the upper part of the arm at heart level. Place the middle of the cuff directly above the bend of the elbow. Check the monitor's instruction manual for an illustration.    Take multiple readings. When you measure, take 2 to 3 readings one minute apart and record all of the results.    Take your blood pressure at the same time every day, or as your healthcare provider recommends.    Record the date, time, and blood pressure reading.    Take the record with you to your next medical appointment. If your blood pressure monitor has a built-in memory, simply take the monitor with you to your next appointment.    Call your provider if you have several high readings. Don't be frightened by a single high blood pressure reading, but if you get several high readings, check in with your healthcare provider.    Note: When blood pressure reaches a systolic (top number) of 180 or higher OR diastolic (bottom number) of 110 or higher, seek emergency medical treatment.  Follow-up care  Because a new blood pressure medicine was started today, it s important that you have your blood pressure rechecked. This is to make sure that the medicine is working and that you have no serious side effects. Keep all your follow up appointments. Write down medicine and blood pressure questions and bring them to your next appointment. If you have pressing concerns about your new medicine or your blood pressure, call your provider. Unless told otherwise, follow up with your healthcare provider or this facility within the next 3 days.  When to seek medical  advice  Call your healthcare provider right away if any of these occur:    Blood pressure reaches a systolic (top number) of 180 or higher, OR diastolic (bottom number) of 110 or higher, seek emergency medical treatment.    Chest pain or shortness of breath    Severe headache    Throbbing or rushing sound in the ears    Nosebleed    Sudden severe pain in your belly (abdomen)    Extreme drowsiness, confusion, or fainting    Dizziness or dizziness with a spinning sensation (vertigo)    Weakness of an arm or leg or one side of the face    You have problems speaking or seeing   Date Last Reviewed: 12/1/2016 2000-2017 [employer]. [address] [place], [place] [address]. All rights reserved. This information is not intended as a substitute for professional medical care. Always follow your healthcare professional's instructions.      [facility] (formerly [facility])    If you have any questions regarding to your visit please contact your care team:       Team Red:   Clinic Hours Telephone Number   Dr. Dori Monae, NP   7am-7pm  Monday - Thursday   7am-5pm  Fridays  [phone]  (Appointment scheduling available 24/7)    Questions about your recent visit?   Team Line  [phone]   Urgent Care - [place] and [place] - 11am-9pm Monday-Friday Saturday-Sunday- 9am-5pm   [place] - 5pm-9pm Monday-Friday Saturday-Sunday- 9am-5pm  [phone] - [facility]  [phone] - [place]       What options do I have for a visit other than an office visit? We offer electronic visits (e-visits) and telephone visits, when medically appropriate.  Please check with your medical insurance to see if these types of visits are covered, as you will be responsible for any charges that are not paid by your insurance.      You can use [employer] (secure electronic communication) to access to your chart, send your primary care provider a message, or make an  "appointment. Ask a team member how to get started.     For a price quote for your services, please call our Consumer Price Line at [phone] or our Imaging Cost estimation line at [phone] (for imaging tests).    Michelle Ramey MA            Follow-ups after your visit        Future tests that were ordered for you today     Open Future Orders        Priority Expected Expires Ordered    MA SCREENING DIGITAL BILAT - Future  (s+30) Routine  6/7/2019 6/7/2018            Who to contact     If you have questions or need follow up information about today's clinic visit or your schedule please contact [facility] directly at [phone].  Normal or non-critical lab and imaging results will be communicated to you by MyChart, letter or phone within 4 business days after the clinic has received the results. If you do not hear from us within 7 days, please contact the clinic through MyChart or phone. If you have a critical or abnormal lab result, we will notify you by phone as soon as possible.  Submit refill requests through [employer] or call your pharmacy and they will forward the refill request to us. Please allow 3 business days for your refill to be completed.          Additional Information About Your Visit        Care EveryWhere ID     This is your Care EveryWhere ID. This could be used by other organizations to access your [place] medical records  [id number]        Your Vitals Were     Pulse Temperature Respirations Height Pulse Oximetry BMI (Body Mass Index)    92 98.1  F (36.7  C) (Oral) 18 5' 3.43\" (1.611 m) 92% 35.23 kg/m2       Blood Pressure from Last 3 Encounters:   06/07/18 140/88   04/25/18 (!) 163/97   03/23/18 (!) 166/101    Weight from Last 3 Encounters:   06/07/18 201 lb 9.6 oz (91.4 kg)   04/25/18 204 lb 3.2 oz (92.6 kg)   03/23/18 204 lb (92.5 kg)              We Performed the Following     *UA reflex to Microscopic and Culture ([place] and [facility] (except [place] and " [place])     Basic metabolic panel     HIV Antigen Antibody Combo     Lipid panel reflex to direct LDL Fasting     TSH with free T4 reflex          Today's Medication Changes          These changes are accurate as of 6/7/18  7:49 AM.  If you have any questions, ask your nurse or doctor.               Start taking these medicines.        Dose/Directions    lisinopril 10 MG tablet   Commonly known as:  PRINIVIL/ZESTRIL   Used for:  Benign essential hypertension   Started by:  Chloé Wilder MD        Dose:  10 mg   Take 1 tablet (10 mg) by mouth daily   Quantity:  30 tablet   Refills:  1            Where to get your medicines      These medications were sent to [id number] - [place], [place] - [address] AT [place] [address]  [address], [place] [address]     Phone:  [phone]     lisinopril 10 MG tablet                Primary Care Provider Office Phone # Fax #    Chloé Wilder [phone] [address] [facility] [address]        Equal Access to Services     [facility] AH: Hadii constantino ku hadasho Soomaali, waaxda luqadaha, qaybta kaalmada adeegyada, waxay geein haywalter smith . So [facility] [phone].    ATENCIÓN: Si habla español, tiene a landa disposición servicios gratuitos de asistencia lingüística. [facility] [phone].    We comply with applicable federal civil rights laws and [place] laws. We do not discriminate on the basis of race, color, national origin, age, disability, sex, sexual orientation, or gender identity.            Thank you!     Thank you for choosing [facility]  for your care. Our goal is always to provide you with excellent care. Hearing back from our patients is one way we can continue to improve our services. Please take a few minutes to complete the written survey that you may receive in the mail after your visit with us. Thank you!             Your Updated Medication List - Protect others around  you: Learn how to safely use, store and throw away your medicines at www.disposemymeds.org.          This list is accurate as of 6/7/18  7:49 AM.  Always use your most recent med list.                   Brand Name Dispense Instructions for use Diagnosis    lisinopril 10 MG tablet    PRINIVIL/ZESTRIL    30 tablet    Take 1 tablet (10 mg) by mouth daily    Benign essential hypertension       medroxyPROGESTERone 150 MG/ML injection    DEPO-PROVERA    1 mL    Inject 1 mL (150 mg) into the muscle every 3 months    Dysfunctional uterine bleeding

## 2018-06-07 NOTE — PATIENT INSTRUCTIONS
Lisinopril Oral tablet  What is this medicine?  LISINOPRIL (lyse IN oh pril) is an ACE inhibitor. This medicine is used to treat high blood pressure and heart failure. It is also used to protect the heart immediately after a heart attack.  This medicine may be used for other purposes; ask your health care provider or pharmacist if you have questions.  What should I tell my health care provider before I take this medicine?  They need to know if you have any of these conditions:    diabetes    heart or blood vessel disease    immune system disease like lupus or scleroderma    kidney disease    low blood pressure    previous swelling of the tongue, face, or lips with difficulty breathing, difficulty swallowing, hoarseness, or tightening of the throat    an unusual or allergic reaction to lisinopril, other ACE inhibitors, insect venom, foods, dyes, or preservatives    pregnant or trying to get pregnant    breast-feeding  How should I use this medicine?  Take this medicine by mouth with a glass of water. Follow the directions on your prescription label. You may take this medicine with or without food. Take your medicine at regular intervals. Do not stop taking this medicine except on the advice of your doctor or health care professional.  Talk to your pediatrician regarding the use of this medicine in children. Special care may be needed. While this drug may be prescribed for children as young as 6 years of age for selected conditions, precautions do apply.  Overdosage: If you think you have taken too much of this medicine contact a poison control center or emergency room at once.  NOTE: This medicine is only for you. Do not share this medicine with others.  What if I miss a dose?  If you miss a dose, take it as soon as you can. If it is almost time for your next dose, take only that dose. Do not take double or extra doses.  What may interact with this medicine?    diuretics    lithium    NSAIDs, medicines for pain and  inflammation, like ibuprofen or naproxen    over-the-counter herbal supplements like hawthorn    potassium salts or potassium supplements    salt substitutes  This list may not describe all possible interactions. Give your health care provider a list of all the medicines, herbs, non-prescription drugs, or dietary supplements you use. Also tell them if you smoke, drink alcohol, or use illegal drugs. Some items may interact with your medicine.  What should I watch for while using this medicine?  Visit your doctor or health care professional for regular check ups. Check your blood pressure as directed. Ask your doctor what your blood pressure should be, and when you should contact him or her. Call your doctor or health care professional if you notice an irregular or fast heart beat.  Women should inform their doctor if they wish to become pregnant or think they might be pregnant. There is a potential for serious side effects to an unborn child. Talk to your health care professional or pharmacist for more information.  Check with your doctor or health care professional if you get an attack of severe diarrhea, nausea and vomiting, or if you sweat a lot. The loss of too much body fluid can make it dangerous for you to take this medicine.  You may get drowsy or dizzy. Do not drive, use machinery, or do anything that needs mental alertness until you know how this drug affects you. Do not stand or sit up quickly, especially if you are an older patient. This reduces the risk of dizzy or fainting spells. Alcohol can make you more drowsy and dizzy. Avoid alcoholic drinks.  Avoid salt substitutes unless you are told otherwise by your doctor or health care professional.  Do not treat yourself for coughs, colds, or pain while you are taking this medicine without asking your doctor or health care professional for advice. Some ingredients may increase your blood pressure.  What side effects may I notice from receiving this medicine?   Side effects that you should report to your doctor or health care professional as soon as possible:    abdominal pain with or without nausea or vomiting    allergic reactions like skin rash or hives, swelling of the hands, feet, face, lips, throat, or tongue    dark urine    difficulty breathing    dizzy, lightheaded or fainting spell    fever or sore throat    irregular heart beat, chest pain    pain or difficulty passing urine    redness, blistering, peeling or loosening of the skin, including inside the mouth    unusually weak    yellowing of the eyes or skin  Side effects that usually do not require medical attention (report to your doctor or health care professional if they continue or are bothersome):    change in taste    cough    decreased sexual function or desire    headache    sun sensitivity    tiredness  This list may not describe all possible side effects. Call your doctor for medical advice about side effects. You may report side effects to FDA at 9-608-FDA-9393.  Where should I keep my medicine?  Keep out of the reach of children.  Store at room temperature between 15 and 30 degrees C (59 and 86 degrees F). Protect from moisture. Keep container tightly closed. Throw away any unused medicine after the expiration date.  NOTE:This sheet is a summary. It may not cover all possible information. If you have questions about this medicine, talk to your doctor, pharmacist, or health care provider. Copyright  2016 Gold Standard        High Blood Pressure, New, Begin Treatment  Your blood pressure was high enough today to start treatment with medicines. Often healthcare providers don t know what causes high blood pressure (hypertension). But it can be controlled with lifestyle changes and medicines. High blood pressure usually has no symptoms. But it can sometimes cause headache, dizziness, blurred vision, a rushing sound in your ears, chest pain, or shortness of breath. But even without symptoms, high blood  pressure that s not treated raises your risk for heart attack, heart failure, and stroke. High blood pressure is a serious health risk and shouldn t be ignored.    Blood pressure measurements are given as 2 numbers. Systolic blood pressure is the upper number. This is the pressure when the heart contracts. Diastolic blood pressure is the lower number. This is the pressure when the heart relaxes between beats. You will see your blood pressure readings written together. For example, a person with a systolic pressure of 118 and a diastolic pressure of 78 will have 118/78 written in the medical record.   Blood pressure is categorized as normal, elevated, or stage 1 or stage 2 high blood pressure:    Normal blood pressure is systolic of less than 120 and diastolic of less than 80 (120/80)    Elevated blood pressure is systolic of 120 to 129 and diastolic less than 80    Stage 1 high blood pressure is systolic is 130 to 139 or diastolic between 80 to 89    Stage 2 high blood pressure is when systolic is 140 or higher or the diastolic is 90 or higher  Home care  If you have high blood pressure, you should do what is listed below to lower your blood pressure. If you are taking medicines for high blood pressure, these methods may reduce or end your need for medicines in the future.    Begin a weight-loss program if you are overweight.    Cut back on how much salt you get in your diet. Here s how to do this:  ? Don t eat foods that have a lot of salt. These include olives, pickles, smoked meats, and salted potato chips.  ? Don t add salt to your food at the table.  ? Use only small amounts of salt when cooking.  ? Review food labels to track how much salt is in prepared foods.  ? When eating out, ask that no additional salt be added to your food order.    Begin an exercise program. Talk with your healthcare provider about the type of exercise program that would be best for you. It doesn't have to be hard. Even brisk walking  for 20 minutes 3 times a week is a good form of exercise.    Don t take medicines that have heart stimulants. This includes many over-the-counter cold and sinus decongestant pills and sprays, as well as diet pills. Check the warnings about high blood pressure on the label. Before purchasing any over-the-counter medicines or supplements, always ask the pharmacist about the product's potential interaction with your high blood pressure and your high blood pressure medicines.    Stimulants such as amphetamine or cocaine could be lethal for someone with high blood pressure. Never take these.    Limit how much caffeine you get in your diet. Switch to caffeine-free products.    Stop smoking. If you are a long-time smoker, this can be hard. Enroll in a stop-smoking program to make it more likely that you will quit for good.    Learn how to handle stress. This is an important part of any program to lower blood pressure. Learn about relaxation methods like meditation, yoga, or biofeedback.    If your provider prescribed medicines, take them exactly as directed. Missing doses may cause your blood pressure get out of control.    If you miss a dose or doses, check with your healthcare provider or pharmacist about what to do.    Consider buying an automatic blood pressure machine. Your provider can make a recommendation. You can get one of these at most pharmacies.  The American Heart Association recommends the following guidelines for home blood pressure monitoring:    Don't smoke or drink coffee or other caffeinated drinks for 30 minutes before taking your blood pressure.    Go to the bathroom before the test.    Relax for 5 minutes before taking the measurement.    Sit with your back supported (don't sit on a couch or soft chair); keep your feet on the floor uncrossed. Place your arm on a solid flat surface (like a table) with the upper part of the arm at heart level. Place the middle of the cuff directly above the bend of the  elbow. Check the monitor's instruction manual for an illustration.    Take multiple readings. When you measure, take 2 to 3 readings one minute apart and record all of the results.    Take your blood pressure at the same time every day, or as your healthcare provider recommends.    Record the date, time, and blood pressure reading.    Take the record with you to your next medical appointment. If your blood pressure monitor has a built-in memory, simply take the monitor with you to your next appointment.    Call your provider if you have several high readings. Don't be frightened by a single high blood pressure reading, but if you get several high readings, check in with your healthcare provider.    Note: When blood pressure reaches a systolic (top number) of 180 or higher OR diastolic (bottom number) of 110 or higher, seek emergency medical treatment.  Follow-up care  Because a new blood pressure medicine was started today, it s important that you have your blood pressure rechecked. This is to make sure that the medicine is working and that you have no serious side effects. Keep all your follow up appointments. Write down medicine and blood pressure questions and bring them to your next appointment. If you have pressing concerns about your new medicine or your blood pressure, call your provider. Unless told otherwise, follow up with your healthcare provider or this facility within the next 3 days.  When to seek medical advice  Call your healthcare provider right away if any of these occur:    Blood pressure reaches a systolic (top number) of 180 or higher, OR diastolic (bottom number) of 110 or higher, seek emergency medical treatment.    Chest pain or shortness of breath    Severe headache    Throbbing or rushing sound in the ears    Nosebleed    Sudden severe pain in your belly (abdomen)    Extreme drowsiness, confusion, or fainting    Dizziness or dizziness with a spinning sensation (vertigo)    Weakness of an  arm or leg or one side of the face    You have problems speaking or seeing   Date Last Reviewed: 12/1/2016 2000-2017 [employer]. [address] [place] [address], [place] [address]. All rights reserved. This information is not intended as a substitute for professional medical care. Always follow your healthcare professional's instructions.      [facility]    If you have any questions regarding to your visit please contact your care team:       Team Red:   Clinic Hours Telephone Number   Dr. Dori Monae, NP   7am-7pm  Monday - Thursday   7am-5pm  Fridays  [phone]  (Appointment scheduling available 24/7)    Questions about your recent visit?   Team Line  [phone]   Urgent Care - [place] and [facility] - 11am-9pm Monday-Friday Saturday-Sunday- 9am-5pm   [place] - 5pm-9pm Monday-Friday Saturday-Sunday- 9am-5pm  [phone] - [place]  [phone] - [place]       What options do I have for a visit other than an office visit? We offer electronic visits (e-visits) and telephone visits, when medically appropriate.  Please check with your medical insurance to see if these types of visits are covered, as you will be responsible for any charges that are not paid by your insurance.      You can use [employer] (secure electronic communication) to access to your chart, send your primary care provider a message, or make an appointment. Ask a team member how to get started.     For a price quote for your services, please call our Consumer Price Line at [phone] or our Imaging Cost estimation line at [phone] (for imaging tests).    Michelle Ramey MA

## 2018-06-07 NOTE — LETTER
[facility]  [address] [place] [address]    June 7, 2018    Elisabet Dominguez  [address]   [place] [address]          Dear Elisabet,  Normal Urine   Normal Cholesterol   Your Triglycerides are high. I am mailing you a Diet.Please eat less sugar containing foods,alcohol,pasta,rice ,crackers,white rice  and fats. Exercise about 30-45 minutes at least 5 times / week .   Thyroid is normal   Take care   Enclosed is a copy of your results.     Results for orders placed or performed in visit on 06/07/18   Basic metabolic panel   Result Value Ref Range    Sodium 141 133 - 144 mmol/L    Potassium 3.9 3.4 - 5.3 mmol/L    Chloride 108 94 - 109 mmol/L    Carbon Dioxide 22 20 - 32 mmol/L    Anion Gap 11 3 - 14 mmol/L    Glucose 92 70 - 99 mg/dL    Urea Nitrogen 9 7 - 30 mg/dL    Creatinine 0.62 0.52 - 1.04 mg/dL    GFR Estimate >90 >60 mL/min/1.7m2    GFR Estimate If Black >90 >60 mL/min/1.7m2    Calcium 8.9 8.5 - 10.1 mg/dL   Lipid panel reflex to direct LDL Fasting   Result Value Ref Range    Cholesterol 172 <200 mg/dL    Triglycerides 329 (H) <150 mg/dL    HDL Cholesterol 50 >49 mg/dL    LDL Cholesterol Calculated 56 <100 mg/dL    Non HDL Cholesterol 122 <130 mg/dL   TSH with free T4 reflex   Result Value Ref Range    TSH 0.73 0.40 - 4.00 mU/L   *UA reflex to Microscopic and Culture ([place] and [facility] (except [place] and [place])   Result Value Ref Range    Color Urine Yellow     Appearance Urine Clear     Glucose Urine Negative NEG^Negative mg/dL    Bilirubin Urine Negative NEG^Negative    Ketones Urine Negative NEG^Negative mg/dL    Specific Gravity Urine >1.030 1.003 - 1.035    Blood Urine Negative NEG^Negative    pH Urine 5.5 5.0 - 7.0 pH    Protein Albumin Urine Negative NEG^Negative mg/dL    Urobilinogen Urine 0.2 0.2 - 1.0 EU/dL    Nitrite Urine Negative NEG^Negative    Leukocyte Esterase Urine Negative  NEG^Negative    Source Midstream Urine    HIV Antigen Antibody Combo   Result Value Ref Range    HIV Antigen Antibody Combo Nonreactive NR^Nonreactive           If you have any questions or concerns, please call myself or my nurse at [phone].      Sincerely,        Chloé Wilder MD/pb

## 2018-06-27 NOTE — PROGRESS NOTES
SUBJECTIVE:   Elisabet Dominguez is a 48 year old female who presents to clinic today for the following health issues:    Hypertension Follow-up      Outpatient blood pressures are not being checked.    Low Salt Diet: Just starting      Amount of exercise or physical activity: None    Problems taking medications regularly: No    Medication side effects: unsure has period x 3 weeks, started 3 days after starting medication    Diet: regular (no restrictions)      Musculoskeletal problem/pain      Duration: Few months    Description  Location: left elbow    Intensity:  moderate    Accompanying signs and symptoms: radiation of pain to wrist    History  Previous similar problem: no   Previous evaluation:  none    Precipitating or alleviating factors:  Trauma or overuse: no   Aggravating factors include: moving and lifting    Therapies tried and outcome: tennis elbow brace helps a little.    Was shoveling snow in april      Problem list and histories reviewed & adjusted, as indicated.  Additional history: as documented    Patient Active Problem List   Diagnosis     CARDIOVASCULAR SCREENING; LDL GOAL LESS THAN 160     Obesity     Hypertension goal BP (blood pressure) < 140/90     BMI> 35     Past Surgical History:   Procedure Laterality Date     CRYOTHERAPY, CERVICAL  1995       Social History   Substance Use Topics     Smoking status: Former Smoker     Packs/day: 0.10     Years: 20.00     Types: Cigarettes     Smokeless tobacco: Never Used     Alcohol use 1.2 oz/week     2 Standard drinks or equivalent per week     Family History   Problem Relation Age of Onset     C.A.D. Mother 63     Hypertension Father      Cancer Father      bladder     C.A.D. Maternal Grandmother      C.A.D. Maternal Aunt      C.A.D. Maternal Uncle      Breast Cancer No family hx of      Cancer - colorectal No family hx of      Diabetes No family hx of          Current Outpatient Prescriptions   Medication Sig Dispense Refill     lisinopril  (PRINIVIL/ZESTRIL) 10 MG tablet Take 1 tablet (10 mg) by mouth daily 30 tablet 1     medroxyPROGESTERone (DEPO-PROVERA) 150 MG/ML injection Inject 1 mL (150 mg) into the muscle every 3 months 1 mL 0     naproxen (NAPROSYN) 500 MG tablet Take 1 tablet (500 mg) by mouth 2 times daily as needed for moderate pain 30 tablet 1     Allergies   Allergen Reactions     Nkda [No Known Drug Allergies]      Recent Labs   Lab Test  06/07/18   0757  03/23/18   1200  11/01/13   0753  09/12/12   1443   06/16/11   0658   LDL  56   --   83   --    --   83   HDL  50   --   47*   --    --   58   TRIG  329*   --   219*   --    --   201*   CR  0.62   --    --   0.61   --    --    GFRESTIMATED  >90   --    --   >90   --    --    GFRESTBLACK  >90   --    --   >90   --    --    POTASSIUM  3.9   --    --   4.2   --    --    TSH  0.73  1.12   --   1.21   < >   --     < > = values in this interval not displayed.      BP Readings from Last 3 Encounters:   06/28/18 124/78   06/07/18 140/88   04/25/18 (!) 163/97    Wt Readings from Last 3 Encounters:   06/07/18 201 lb 9.6 oz (91.4 kg)   04/25/18 204 lb 3.2 oz (92.6 kg)   03/23/18 204 lb (92.5 kg)                  Labs reviewed in EPIC    Reviewed and updated as needed this visit by clinical staff  Allergies  Meds       Reviewed and updated as needed this visit by Provider         ROS:  CONSTITUTIONAL: NEGATIVE for fever, chills, change in weight  ENT/MOUTH: NEGATIVE for ear, mouth and throat problems  RESP: NEGATIVE for significant cough or SOB  CV: NEGATIVE for chest pain, palpitations or peripheral edema  MUSCULOSKELETAL: as above  PSYCHIATRIC: NEGATIVE for changes in mood or affect    OBJECTIVE:     /78  Pulse 85  Temp 97.3  F (36.3  C) (Oral)  Resp 14  SpO2 97%  There is no height or weight on file to calculate BMI.  GENERAL: healthy, alert and no distress  NECK: no adenopathy, no asymmetry, masses, or scars and thyroid normal to palpation  RESP: lungs clear to auscultation - no  rales, rhonchi or wheezes  CV: regular rate and rhythm, normal S1 S2, no S3 or S4, no murmur, click or rub, no peripheral edema and peripheral pulses strong  ABDOMEN: soft, nontender, no hepatosplenomegaly, no masses and bowel sounds normal  MS: tenderness lateral epicondyle  Left elbow  ROM is good    Diagnostic Test Results:  Pending     ASSESSMENT/PLAN:         1. Hypertension goal BP (blood pressure) < 140/90  Controlled  Potassium pending   2. Lateral epicondylitis of left elbow  Handouts Given  Advised Brace-she has one  NSAID  Consider PT  Rest  Follow up if not better  - Potassium  Chloé Wilder MD  [facility]

## 2018-06-28 ENCOUNTER — OFFICE VISIT (OUTPATIENT)
Dept: FAMILY MEDICINE | Facility: CLINIC | Age: 48
End: 2018-06-28
Payer: COMMERCIAL

## 2018-06-28 VITALS
SYSTOLIC BLOOD PRESSURE: 124 MMHG | OXYGEN SATURATION: 97 % | DIASTOLIC BLOOD PRESSURE: 78 MMHG | TEMPERATURE: 97.3 F | RESPIRATION RATE: 14 BRPM | HEART RATE: 85 BPM

## 2018-06-28 DIAGNOSIS — I10 BENIGN ESSENTIAL HYPERTENSION: ICD-10-CM

## 2018-06-28 DIAGNOSIS — M77.12 LATERAL EPICONDYLITIS OF LEFT ELBOW: ICD-10-CM

## 2018-06-28 DIAGNOSIS — I10 HYPERTENSION GOAL BP (BLOOD PRESSURE) < 140/90: Primary | ICD-10-CM

## 2018-06-28 LAB — POTASSIUM SERPL-SCNC: 4.4 MMOL/L (ref 3.4–5.3)

## 2018-06-28 PROCEDURE — 84132 ASSAY OF SERUM POTASSIUM: CPT | Performed by: FAMILY MEDICINE

## 2018-06-28 PROCEDURE — 36415 COLL VENOUS BLD VENIPUNCTURE: CPT | Performed by: FAMILY MEDICINE

## 2018-06-28 PROCEDURE — 99213 OFFICE O/P EST LOW 20 MIN: CPT | Performed by: FAMILY MEDICINE

## 2018-06-28 RX ORDER — LISINOPRIL 10 MG/1
10 TABLET ORAL DAILY
Qty: 90 TABLET | Refills: 3 | Status: SHIPPED | OUTPATIENT
Start: 2018-06-28 | End: 2019-05-09

## 2018-06-28 RX ORDER — NAPROXEN 500 MG/1
500 TABLET ORAL 2 TIMES DAILY PRN
Qty: 30 TABLET | Refills: 1 | Status: SHIPPED | OUTPATIENT
Start: 2018-06-28 | End: 2019-05-09

## 2018-06-28 ASSESSMENT — PAIN SCALES - GENERAL: PAINLEVEL: NO PAIN (0)

## 2018-06-28 NOTE — LETTER
[facility]  [address] [place] [address]    June 28, 2018    Elisabet Dominguez  [address]   [place] [address]          Dear Elisabet,  Potassium is normal   Enclosed is a copy of your results.     Results for orders placed or performed in visit on 06/28/18   Potassium   Result Value Ref Range    Potassium 4.4 3.4 - 5.3 mmol/L       If you have any questions or concerns, please call myself or my nurse at [phone].      Sincerely,        Chloé Wilder MD/pb

## 2018-06-28 NOTE — PATIENT INSTRUCTIONS
Understanding Lateral Epicondylitis    Tendons are strong bands of tissue that connect muscles to bones. Lateral epicondylitis affects the tendons that connect muscles in the forearm to the lateral epicondyle. This is the bony knob on the outer side of the elbow. The condition occurs if the extensor tendons of the wrist become red and swollen (irritated). This can cause pain in the elbow, forearm, and wrist. Because the condition is sometimes caused by playing tennis, it is also known as  tennis elbow.   How to say it  LA-tuhr-arturo je-[id number]-PZN-duh-LY-tis   What causes lateral epicondylitis?  The condition most often occurs because of overuse. This can be from any activity that repeatedly puts stress on the forearm extensor muscles or tendons and wrist. For instance, playing tennis, lifting weights, cutting meat, painting, and typing can all cause the condition. Wear and tear of the tendons from aging or an injury to the tendons can also cause the condition.  Symptoms of lateral epicondylitis  The most common symptom is pain. You may feel it on the outer side of the elbow and down the back of the forearm. It may be worse when moving or using the elbow, forearm, or wrist. You may also feel pain when gripping or lifting things.  Treatment for lateral epicondylitis  Treatments may include:    Resting the elbow, forearm, and wrist. You ll need to avoid movements that can make your symptoms worse. You also may need to avoid certain sports and types of work for a time. This helps relieve symptoms and prevent further damage to the tendons.    Changing the action that caused the problem. For instance, if the tendons were damaged from playing tennis, it may help to change your playing technique or use different equipment. This helps prevent further damage to the tendons.    Using cold packs. Putting an ice pack on the injured area can help reduce pain and swelling.    Taking pain medicines. Taking prescription or  over-the-counter pain medicines may help reduce pain and swelling.      Wearing a brace. This helps reduce strain on the muscles and tendons in the forearm, which may relieve symptoms. It is very important to wear the brace properly.    Doing exercises and physical therapy. These help improve strength and range of motion in the elbow, forearm, and wrist.    Getting shots of medicine into the injured area. These may help relieve symptoms for a time.    Having surgery. This may be an option if other treatments fail to relieve symptoms. In many cases, the surgeon removes the damaged tissue.  Possible complications of lateral epicondylitis  If the tendons involved don t heal properly, symptoms may return or get worse. To help prevent this, follow your treatment plan as directed.  When to call your healthcare provider  Call your healthcare provider right away if you have any of these:    Fever of 100.4 F (38 C) or higher, or as directed    Redness, swelling, or warmth in the elbow or forearm that gets worse    Symptoms that don t get better with treatment, or get worse    New symptoms   Date Last Reviewed: 3/10/2016    2133-9146 [employer]. [address] [place] [address], [place] [address]. All rights reserved. This information is not intended as a substitute for professional medical care. Always follow your healthcare professional's instructions.

## 2018-06-28 NOTE — MR AVS SNAPSHOT
After Visit Summary   6/28/2018    Elisabet Dominguez    MRN: [id number]           Patient Information     Date Of Birth          1970        Visit Information        Provider Department      6/28/2018 7:00 AM Chloé Wilder MD [facility]        Today's Diagnoses     Hypertension goal BP (blood pressure) < 140/90    -  1    Lateral epicondylitis of left elbow          Care Instructions      Understanding Lateral Epicondylitis    Tendons are strong bands of tissue that connect muscles to bones. Lateral epicondylitis affects the tendons that connect muscles in the forearm to the lateral epicondyle. This is the bony knob on the outer side of the elbow. The condition occurs if the extensor tendons of the wrist become red and swollen (irritated). This can cause pain in the elbow, forearm, and wrist. Because the condition is sometimes caused by playing tennis, it is also known as  tennis elbow.   How to say it  LA-tuhr-arturo ji-ki-XWK-duh-LY-tis   What causes lateral epicondylitis?  The condition most often occurs because of overuse. This can be from any activity that repeatedly puts stress on the forearm extensor muscles or tendons and wrist. For instance, playing tennis, lifting weights, cutting meat, painting, and typing can all cause the condition. Wear and tear of the tendons from aging or an injury to the tendons can also cause the condition.  Symptoms of lateral epicondylitis  The most common symptom is pain. You may feel it on the outer side of the elbow and down the back of the forearm. It may be worse when moving or using the elbow, forearm, or wrist. You may also feel pain when gripping or lifting things.  Treatment for lateral epicondylitis  Treatments may include:    Resting the elbow, forearm, and wrist. You ll need to avoid movements that can make your symptoms worse. You also may need to avoid certain sports and types of work for a time. This helps relieve symptoms and prevent  further damage to the tendons.    Changing the action that caused the problem. For instance, if the tendons were damaged from playing tennis, it may help to change your playing technique or use different equipment. This helps prevent further damage to the tendons.    Using cold packs. Putting an ice pack on the injured area can help reduce pain and swelling.    Taking pain medicines. Taking prescription or over-the-counter pain medicines may help reduce pain and swelling.      Wearing a brace. This helps reduce strain on the muscles and tendons in the forearm, which may relieve symptoms. It is very important to wear the brace properly.    Doing exercises and physical therapy. These help improve strength and range of motion in the elbow, forearm, and wrist.    Getting shots of medicine into the injured area. These may help relieve symptoms for a time.    Having surgery. This may be an option if other treatments fail to relieve symptoms. In many cases, the surgeon removes the damaged tissue.  Possible complications of lateral epicondylitis  If the tendons involved don t heal properly, symptoms may return or get worse. To help prevent this, follow your treatment plan as directed.  When to call your healthcare provider  Call your healthcare provider right away if you have any of these:    Fever of 100.4 F (38 C) or higher, or as directed    Redness, swelling, or warmth in the elbow or forearm that gets worse    Symptoms that don t get better with treatment, or get worse    New symptoms   Date Last Reviewed: 3/10/2016    [phone] [employer]. [address] [place] [address]. All rights reserved. This information is not intended as a substitute for professional medical care. Always follow your healthcare professional's instructions.                Follow-ups after your visit        Your next 10 appointments already scheduled     Jul 02, 2018  7:45 AM CDT   Nurse Only with [facility]   [place]  [facility] ([facility])    [address]  [place] [address] [phone]              Who to contact     If you have questions or need follow up information about today's clinic visit or your schedule please contact [facility] directly at [phone].  Normal or non-critical lab and imaging results will be communicated to you by MyChart, letter or phone within 4 business days after the clinic has received the results. If you do not hear from us within 7 days, please contact the clinic through MyChart or phone. If you have a critical or abnormal lab result, we will notify you by phone as soon as possible.  Submit refill requests through [employer] or call your pharmacy and they will forward the refill request to us. Please allow 3 business days for your refill to be completed.          Additional Information About Your Visit        Care EveryWhere ID     This is your Care EveryWhere ID. This could be used by other organizations to access your [place] medical records  [id number]        Your Vitals Were     Pulse Temperature Respirations Pulse Oximetry          85 97.3  F (36.3  C) (Oral) 14 97%         Blood Pressure from Last 3 Encounters:   06/28/18 124/78   06/07/18 140/88   04/25/18 (!) 163/97    Weight from Last 3 Encounters:   06/07/18 201 lb 9.6 oz (91.4 kg)   04/25/18 204 lb 3.2 oz (92.6 kg)   03/23/18 204 lb (92.5 kg)              We Performed the Following     Potassium          Today's Medication Changes          These changes are accurate as of 6/28/18  7:47 AM.  If you have any questions, ask your nurse or doctor.               Start taking these medicines.        Dose/Directions    naproxen 500 MG tablet   Commonly known as:  NAPROSYN   Used for:  Hypertension goal BP (blood pressure) < 140/90   Started by:  Chloé Wilder MD        Dose:  500 mg   Take 1 tablet (500 mg) by mouth 2 times daily as needed for moderate pain   Quantity:  30 tablet   Refills:  1             Where to get your medicines      These medications were sent to [id number] - [place], [place] - [address] AT [facility] [address]  [address], [place] [address]     Phone:  [phone]     naproxen 500 MG tablet                Primary Care Provider Office Phone # Fax #    Chloé Wilder [phone] [address] [facility] [address]        Equal Access to Services     INDER DAS : Hadii aad ku hadasho Soomaali, waaxda luqadaha, qaybta kaalmada adeegyada, waxay idiin hayaan adeeg khruth ann smith . So [facility] [phone].    ATENCIÓN: Si habla español, tiene a landa disposición servicios gratuitos de asistencia lingüística. [facility] [phone].    We comply with applicable federal civil rights laws and [place] laws. We do not discriminate on the basis of race, color, national origin, age, disability, sex, sexual orientation, or gender identity.            Thank you!     Thank you for choosing [facility]  for your care. Our goal is always to provide you with excellent care. Hearing back from our patients is one way we can continue to improve our services. Please take a few minutes to complete the written survey that you may receive in the mail after your visit with us. Thank you!             Your Updated Medication List - Protect others around you: Learn how to safely use, store and throw away your medicines at www.disposemymeds.org.          This list is accurate as of 6/28/18  7:47 AM.  Always use your most recent med list.                   Brand Name Dispense Instructions for use Diagnosis    lisinopril 10 MG tablet    PRINIVIL/ZESTRIL    30 tablet    Take 1 tablet (10 mg) by mouth daily    Benign essential hypertension       medroxyPROGESTERone 150 MG/ML injection    DEPO-PROVERA    1 mL    Inject 1 mL (150 mg) into the muscle every 3 months    Dysfunctional uterine bleeding       naproxen 500 MG tablet    NAPROSYN    30  tablet    Take 1 tablet (500 mg) by mouth 2 times daily as needed for moderate pain    Hypertension goal BP (blood pressure) < 140/90

## 2018-07-02 ENCOUNTER — TELEPHONE (OUTPATIENT)
Dept: FAMILY MEDICINE | Facility: CLINIC | Age: 48
End: 2018-07-02

## 2018-07-02 DIAGNOSIS — N93.8 DYSFUNCTIONAL UTERINE BLEEDING: ICD-10-CM

## 2018-07-02 RX ORDER — MEDROXYPROGESTERONE ACETATE 150 MG/ML
150 INJECTION, SUSPENSION INTRAMUSCULAR
Qty: 3 ML | Refills: 3 | OUTPATIENT
Start: 2018-07-02 | End: 2021-10-26

## 2018-07-02 NOTE — TELEPHONE ENCOUNTER
Spoke to patient and informed her okay to get depo early. Patient has appointment for 7/3/2018.  Ellie HERNÁNDEZ CMA ([facility])

## 2018-07-02 NOTE — TELEPHONE ENCOUNTER
Patient had ancillary appointment today for depo injection. Injection date not due until 7/11/2018-7/25/2018. Patient states provider told her to come in early to receive injection due to ongoing period. No refills let for depo and there is no documentation stating to have patient come in early for injection. If appropriate, please place new depo orders and verify that it is okay for patient to receive injection early.  Ellie HERNÁNDEZ CMA ([facility])

## 2018-07-03 ENCOUNTER — ALLIED HEALTH/NURSE VISIT (OUTPATIENT)
Dept: NURSING | Facility: CLINIC | Age: 48
End: 2018-07-03
Payer: COMMERCIAL

## 2018-07-03 VITALS — BODY MASS INDEX: 35.48 KG/M2 | DIASTOLIC BLOOD PRESSURE: 80 MMHG | WEIGHT: 203 LBS | SYSTOLIC BLOOD PRESSURE: 134 MMHG

## 2018-07-03 DIAGNOSIS — N93.8 DYSFUNCTIONAL UTERINE BLEEDING: Primary | ICD-10-CM

## 2018-07-03 PROCEDURE — 96372 THER/PROPH/DIAG INJ SC/IM: CPT

## 2018-07-03 NOTE — MR AVS SNAPSHOT
After Visit Summary   7/3/2018    Elisabet Dominguez    MRN: [id number]           Patient Information     Date Of Birth          1970        Visit Information        Provider Department      7/3/2018 11:15 AM  ANCILLARY [facility] [place]        Today's Diagnoses     Dysfunctional uterine bleeding    -  1       Follow-ups after your visit        Who to contact     If you have questions or need follow up information about today's clinic visit or your schedule please contact [facility] directly at [phone].  Normal or non-critical lab and imaging results will be communicated to you by MyChart, letter or phone within 4 business days after the clinic has received the results. If you do not hear from us within 7 days, please contact the clinic through MyChart or phone. If you have a critical or abnormal lab result, we will notify you by phone as soon as possible.  Submit refill requests through [employer] or call your pharmacy and they will forward the refill request to us. Please allow 3 business days for your refill to be completed.          Additional Information About Your Visit        Care EveryWhere ID     This is your Care EveryWhere ID. This could be used by other organizations to access your [place] medical records  [id number]        Your Vitals Were     BMI (Body Mass Index)                   35.48 kg/m2            Blood Pressure from Last 3 Encounters:   07/03/18 134/80   06/28/18 124/78   06/07/18 140/88    Weight from Last 3 Encounters:   07/03/18 203 lb (92.1 kg)   06/07/18 201 lb 9.6 oz (91.4 kg)   04/25/18 204 lb 3.2 oz (92.6 kg)              We Performed the Following     C Medroxyprogesterone inj/1mg (J-Code)     THER/PROPH/DIAG INJ, SC/IM        Primary Care Provider Office Phone # Fax #    Chloé Wilder [phone] [address] [place] [address]        Equal Access to Services     DELANEY DAS AH: xena Parra  halley manjarreztaniyadylan hammradha graf ah. So [facility] [phone].    ATENCIÓN: Si ibrahima william, tiene a landa disposición servicios gratuitos de asistencia lingüística. Jonathan al [phone].    We comply with applicable federal civil rights laws and [place] laws. We do not discriminate on the basis of race, color, national origin, age, disability, sex, sexual orientation, or gender identity.            Thank you!     Thank you for choosing [facility]  for your care. Our goal is always to provide you with excellent care. Hearing back from our patients is one way we can continue to improve our services. Please take a few minutes to complete the written survey that you may receive in the mail after your visit with us. Thank you!             Your Updated Medication List - Protect others around you: Learn how to safely use, store and throw away your medicines at www.disposemymeds.org.          This list is accurate as of 7/3/18  1:06 PM.  Always use your most recent med list.                   Brand Name Dispense Instructions for use Diagnosis    lisinopril 10 MG tablet    PRINIVIL/ZESTRIL    90 tablet    Take 1 tablet (10 mg) by mouth daily    Benign essential hypertension       medroxyPROGESTERone 150 MG/ML injection    DEPO-PROVERA    3 mL    Inject 1 mL (150 mg) into the muscle every 3 months    Dysfunctional uterine bleeding       naproxen 500 MG tablet    NAPROSYN    30 tablet    Take 1 tablet (500 mg) by mouth 2 times daily as needed for moderate pain    Lateral epicondylitis of left elbow

## 2018-07-03 NOTE — PROGRESS NOTES
Follow Up Injection    Patient returning during stated date range given at previous visit: no see 07/02/18 telephone encounter.       If here at the correct interval:   BP Readings from Last 1 Encounters:   07/03/18 134/80     Wt Readings from Last 1 Encounters:   07/03/18 203 lb (92.1 kg)       Last Pap/exam date:   Lab Results   Component Value Date    PAP NIL 03/23/2018           Side effects or problems with last injection?  No.      See Medication Note for administration information    Staff Sig: Michelle Ramey MA

## 2018-08-13 DIAGNOSIS — I10 BENIGN ESSENTIAL HYPERTENSION: ICD-10-CM

## 2018-08-13 RX ORDER — LISINOPRIL 10 MG/1
TABLET ORAL
Qty: 30 TABLET | Refills: 0
Start: 2018-08-13

## 2018-08-13 NOTE — TELEPHONE ENCOUNTER
Spoke to pharmacy and confirmed duplicate request.    lisinopril (PRINIVIL/ZESTRIL) 10 MG tablet 90 tablet 3 6/28/2018  No      Sig - Route: Take 1 tablet (10 mg) by mouth daily - Oral     Class: E-Prescribe     Order: [id number]     E-Prescribing Status: Receipt confirmed by pharmacy (6/28/2018  7:58 AM CDT)       Ellie HERNÁNDEZ CMA ([facility])

## 2018-09-19 ENCOUNTER — ALLIED HEALTH/NURSE VISIT (OUTPATIENT)
Dept: NURSING | Facility: CLINIC | Age: 48
End: 2018-09-19
Payer: COMMERCIAL

## 2018-09-19 VITALS — DIASTOLIC BLOOD PRESSURE: 84 MMHG | SYSTOLIC BLOOD PRESSURE: 134 MMHG

## 2018-09-19 PROCEDURE — 96372 THER/PROPH/DIAG INJ SC/IM: CPT

## 2018-09-19 PROCEDURE — 99207 ZZC NO CHARGE LOS: CPT

## 2018-09-19 NOTE — MR AVS SNAPSHOT
After Visit Summary   9/19/2018    Elisabet Dominguez    MRN: [id number]           Patient Information     Date Of Birth          1970        Visit Information        Provider Department      9/19/2018 11:15 AM [facility]        Today's Diagnoses     Contraception    -  1       Follow-ups after your visit        Who to contact     If you have questions or need follow up information about today's clinic visit or your schedule please contact [facility] directly at [phone].  Normal or non-critical lab and imaging results will be communicated to you by MyChart, letter or phone within 4 business days after the clinic has received the results. If you do not hear from us within 7 days, please contact the clinic through MyChart or phone. If you have a critical or abnormal lab result, we will notify you by phone as soon as possible.  Submit refill requests through [employer] or call your pharmacy and they will forward the refill request to us. Please allow 3 business days for your refill to be completed.          Additional Information About Your Visit        Care EveryWhere ID     This is your Care EveryWhere ID. This could be used by other organizations to access your [place] medical records  [id number]         Blood Pressure from Last 3 Encounters:   09/19/18 134/84   07/03/18 134/80   06/28/18 124/78    Weight from Last 3 Encounters:   07/03/18 203 lb (92.1 kg)   06/07/18 201 lb 9.6 oz (91.4 kg)   04/25/18 204 lb 3.2 oz (92.6 kg)              We Performed the Following     INJECTION INTRAMUSCULAR OR SUB-Q     Medroxyprogesterone inj  1mg   (Depo Provera J-Code)        Primary Care Provider Office Phone # Fax #    Chloé Wilder [phone]       [address]  [place] [address]        Equal Access to Services     DELANEY DAS AH: Giovanny Jones, xena manjarrez, qadickta kalouisa raza, radha smith  ah. So [facility] [phone].    ATENCIÓN: Si ibrahima william, tiene a landa disposición servicios gratuitos de asistencia lingüística. Jonathan al [phone].    We comply with applicable federal civil rights laws and [place] laws. We do not discriminate on the basis of race, color, national origin, age, disability, sex, sexual orientation, or gender identity.            Thank you!     [facility]  [facility]. Our goal is always to provide you with excellent care. Hearing back from our patients is one way we can continue to improve our services. Please take a few minutes to complete the written survey that you may receive in the mail after your visit with us. Thank you!             Your Updated Medication List - Protect others around you: Learn how to safely use, store and throw away your medicines at www.disposemymeds.org.          This list is accurate as of 9/19/18 11:18 AM.  Always use your most recent med list.                   Brand Name Dispense Instructions for use Diagnosis    lisinopril 10 MG tablet    PRINIVIL/ZESTRIL    90 tablet    Take 1 tablet (10 mg) by mouth daily    Benign essential hypertension       medroxyPROGESTERone 150 MG/ML injection    DEPO-PROVERA    3 mL    Inject 1 mL (150 mg) into the muscle every 3 months    Dysfunctional uterine bleeding       naproxen 500 MG tablet    NAPROSYN    30 tablet    Take 1 tablet (500 mg) by mouth 2 times daily as needed for moderate pain    Lateral epicondylitis of left elbow

## 2018-09-19 NOTE — NURSING NOTE
Prior to injection verified patient identity using patient's name and date of birth.    BP: 134/84    LAST PAP/EXAM: Lab Results       Component                Value               Date                       PAP                      NIL                 03/23/2018              The following medication was given:     MEDICATION: Depo Provera 150mg  ROUTE: IM  SITE: Deltoid - Left  : [email or web address]  LOT #: [id number]  EXP:05/2020  NEXT INJECTION DUE: 12/5/18 - 12/19/18   Provider: Meño HERNÁNDEZ CMA ([facility])

## 2018-12-05 ENCOUNTER — ALLIED HEALTH/NURSE VISIT (OUTPATIENT)
Dept: NURSING | Facility: CLINIC | Age: 48
End: 2018-12-05
Payer: COMMERCIAL

## 2018-12-05 VITALS — DIASTOLIC BLOOD PRESSURE: 84 MMHG | SYSTOLIC BLOOD PRESSURE: 136 MMHG

## 2018-12-05 PROCEDURE — 96372 THER/PROPH/DIAG INJ SC/IM: CPT

## 2018-12-05 PROCEDURE — 99207 ZZC NO CHARGE LOS: CPT

## 2018-12-05 NOTE — NURSING NOTE
Prior to injection verified patient identity using patient's name and date of birth.  Due to injection administration, patient instructed to remain in clinic for 15 minutes  afterwards, and to report any adverse reaction to me immediately.    BP: 136/84    LAST PAP/EXAM: Lab Results       Component                Value               Date                       PAP                      NIL                 03/23/2018            URINE HCG:not indicated    The following medication was given:     MEDICATION: Depo Provera 150mg  ROUTE: IM  SITE: Deltoid - Right  : [employer]  LOT #: [id number]  EXP:10/2019  NEXT INJECTION DUE: 2/20/19 - 3/6/19   Provider: Meño HERNÁNDEZ CMA ([facility])

## 2018-12-05 NOTE — MR AVS SNAPSHOT
After Visit Summary   12/5/2018    Elisabet Dominguez    MRN: [id number]           Patient Information     Date Of Birth          1970        Visit Information        Provider Department      12/5/2018 7:20 AM [facility]        Today's Diagnoses     Contraception    -  1       Follow-ups after your visit        Who to contact     If you have questions or need follow up information about today's clinic visit or your schedule please contact [facility] directly at [phone].  Normal or non-critical lab and imaging results will be communicated to you by MyChart, letter or phone within 4 business days after the clinic has received the results. If you do not hear from us within 7 days, please contact the clinic through MyChart or phone. If you have a critical or abnormal lab result, we will notify you by phone as soon as possible.  Submit refill requests through "[employer] or call your pharmacy and they will forward the refill request to us. Please allow 3 business days for your refill to be completed.          Additional Information About Your Visit        Care EveryWhere ID     This is your Care EveryWhere ID. This could be used by other organizations to access your [facility] medical records  [id number]         Blood Pressure from Last 3 Encounters:   12/05/18 136/84   09/19/18 134/84   07/03/18 134/80    Weight from Last 3 Encounters:   07/03/18 203 lb (92.1 kg)   06/07/18 201 lb 9.6 oz (91.4 kg)   04/25/18 204 lb 3.2 oz (92.6 kg)              We Performed the Following     INJECTION INTRAMUSCULAR OR SUB-Q     Medroxyprogesterone inj  1mg   (Depo Provera J-Code)        Primary Care Provider Office Phone # Fax #    Chloé Wilder [phone]       [address]  [place] [address]        Equal Access to Services     DELANEY DAS AH: Giovanny Jones, xena manjarrez, qadickta kalouisa raza, radha smith  ah. So [facility] [phone].    ATENCIÓN: Si ibrahima william, tiene a landa disposición servicios gratuitos de asistencia lingüística. Jonathan al [phone].    We comply with applicable federal civil rights laws and [place] laws. We do not discriminate on the basis of race, color, national origin, age, disability, sex, sexual orientation, or gender identity.            Thank you!     Thank you for choosing [facility]  for your care. Our goal is always to provide you with excellent care. Hearing back from our patients is one way we can continue to improve our services. Please take a few minutes to complete the written survey that you may receive in the mail after your visit with us. Thank you!             Your Updated Medication List - Protect others around you: Learn how to safely use, store and throw away your medicines at www.disposemymeds.org.          This list is accurate as of 12/5/18  7:24 AM.  Always use your most recent med list.                   Brand Name Dispense Instructions for use Diagnosis    lisinopril 10 MG tablet    PRINIVIL/ZESTRIL    90 tablet    Take 1 tablet (10 mg) by mouth daily    Benign essential hypertension       medroxyPROGESTERone 150 MG/ML IM injection    DEPO-PROVERA    3 mL    Inject 1 mL (150 mg) into the muscle every 3 months    Dysfunctional uterine bleeding       naproxen 500 MG tablet    NAPROSYN    30 tablet    Take 1 tablet (500 mg) by mouth 2 times daily as needed for moderate pain    Lateral epicondylitis of left elbow

## 2019-02-21 ENCOUNTER — ALLIED HEALTH/NURSE VISIT (OUTPATIENT)
Dept: NURSING | Facility: CLINIC | Age: 49
End: 2019-02-21
Payer: COMMERCIAL

## 2019-02-21 VITALS — DIASTOLIC BLOOD PRESSURE: 82 MMHG | SYSTOLIC BLOOD PRESSURE: 132 MMHG

## 2019-02-21 PROCEDURE — 96372 THER/PROPH/DIAG INJ SC/IM: CPT

## 2019-02-21 PROCEDURE — 99207 ZZC NO CHARGE LOS: CPT

## 2019-02-21 RX ADMIN — MEDROXYPROGESTERONE ACETATE 150 MG: 150 INJECTION, SUSPENSION INTRAMUSCULAR at 07:38

## 2019-02-21 NOTE — NURSING NOTE
Prior to injection, verified patient identity using patient's name and date of birth.  Due to injection administration, patient instructed to remain in clinic for 15 minutes  afterwards, and to report any adverse reaction to me immediately.    BP: 132/82    LAST PAP/EXAM:   Lab Results   Component Value Date    PAP NIL 03/23/2018     URINE HCG:not indicated    NEXT INJECTION DUE: 5/9/19 - 5/23/19       Drug Amount Wasted:  None.  Vial/Syringe: Single dose vial  Expiration Date:  07/2020    BP: 132/82    LAST PAP/EXAM:   Lab Results   Component Value Date    PAP NIL 03/23/2018     URINE HCG:not indicated    The following medication was given:     MEDICATION: Depo Provera 150mg  ROUTE: IM  SITE: Deltoid - Left  : [employer]  LOT #: [id number]  EXP:07/2020  NEXT INJECTION DUE: 5/9/19 - 5/23/19   Provider: Meño HERNÁNDEZ CMA ([facility])

## 2019-04-30 ENCOUNTER — OFFICE VISIT (OUTPATIENT)
Dept: OBGYN | Facility: CLINIC | Age: 49
End: 2019-04-30
Payer: COMMERCIAL

## 2019-04-30 VITALS
DIASTOLIC BLOOD PRESSURE: 104 MMHG | WEIGHT: 204 LBS | BODY MASS INDEX: 35.65 KG/M2 | OXYGEN SATURATION: 98 % | HEART RATE: 85 BPM | SYSTOLIC BLOOD PRESSURE: 167 MMHG

## 2019-04-30 DIAGNOSIS — I10 HYPERTENSION GOAL BP (BLOOD PRESSURE) < 140/90: ICD-10-CM

## 2019-04-30 DIAGNOSIS — N92.1 BREAKTHROUGH BLEEDING ON DEPO-PROVERA: Primary | ICD-10-CM

## 2019-04-30 DIAGNOSIS — Z72.0 TOBACCO USE: ICD-10-CM

## 2019-04-30 PROCEDURE — 99214 OFFICE O/P EST MOD 30 MIN: CPT | Performed by: OBSTETRICS & GYNECOLOGY

## 2019-04-30 RX ORDER — ESTRADIOL 1 MG/1
1 TABLET ORAL DAILY
Qty: 30 TABLET | Refills: 1 | Status: SHIPPED | OUTPATIENT
Start: 2019-04-30 | End: 2019-07-11

## 2019-04-30 NOTE — PROGRESS NOTES
Elisabet is a 49 year old  referred here by Dr. Wilder with complaint of abnormal uterine bleeding.    She has been using the Depo Provera for the past 15-16 years.  She was seen last year with dysfunctional uterine bleeding.   She had used the Depo Provera, but she had stopped it the year previously.  She had a work up for the bleeding that was negative (EMB, labs).  She went back on the Depo Provera.  She did not have any additional bleeding until about 2 months ago.  She has had abnormal bleeding that alternates between spotting and then heavy and then back to spotting.  She has had some small dime size clots with it.  She has had cramping with the bleeding.  She has not had any associated dizziness, no pelvic pain, no chest pain..  She has also not had shortness of breath.  She saw Dr. Wilder last year regarding the blood pressure and she was placed on Lisinopril, but she developed a cough and she stopped the medication.  She has not been back to check on alternatives.  She is advised to have another visit with Dr. Wilder regarding the blood pressure.    She is a smoker.       Past Medical History:   Diagnosis Date     Human papillomavirus in conditions classified elsewhere and of unspecified site     abnoraml pap-had Cryo     Hypertension goal BP (blood pressure) < 140/90      Obesity        Past Surgical History:   Procedure Laterality Date     CRYOTHERAPY, CERVICAL         OB History    Para Term  AB Living   1 0 0 0 1 0   SAB TAB Ectopic Multiple Live Births   1 0 0 0 0      # Outcome Date GA Lbr Justice/2nd Weight Sex Delivery Anes PTL Lv   1 SAB                Gynecological History         Patient's last menstrual period was 2019 (approximate).     NoSTD/no PID/no IUD      see above HPI        Allergies   Allergen Reactions     Nkda [No Known Drug Allergies]        Current Outpatient Medications   Medication Sig Dispense Refill     medroxyPROGESTERone (DEPO-PROVERA) 150 MG/ML injection  Inject 1 mL (150 mg) into the muscle every 3 months 3 mL 3     lisinopril (PRINIVIL/ZESTRIL) 10 MG tablet Take 1 tablet (10 mg) by mouth daily (Patient not taking: Reported on 4/30/2019) 90 tablet 3     naproxen (NAPROSYN) 500 MG tablet Take 1 tablet (500 mg) by mouth 2 times daily as needed for moderate pain (Patient not taking: Reported on 4/30/2019) 30 tablet 1       Social History     Socioeconomic History     Marital status:      Spouse name: Not on file     Number of children: 0     Years of education: 16     Highest education level: Not on file   Occupational History     Occupation: accounting     Employer: OTHER   Social Needs     Financial resource strain: Not on file     Food insecurity:     Worry: Not on file     Inability: Not on file     Transportation needs:     Medical: Not on file     Non-medical: Not on file   Tobacco Use     Smoking status: Current Every Day Smoker     Packs/day: 0.02     Years: 20.00     Pack years: 0.40     Types: Cigarettes     Smokeless tobacco: Never Used     Tobacco comment: 2 per day   Substance and Sexual Activity     Alcohol use: Yes     Alcohol/week: 1.2 oz     Types: 2 Standard drinks or equivalent per week     Drug use: No     Sexual activity: Yes     Partners: Male     Birth control/protection: Injection   Lifestyle     Physical activity:     Days per week: Not on file     Minutes per session: Not on file     Stress: Not on file   Relationships     Social connections:     Talks on phone: Not on file     Gets together: Not on file     Attends Worship service: Not on file     Active member of club or organization: Not on file     Attends meetings of clubs or organizations: Not on file     Relationship status: Not on file     Intimate partner violence:     Fear of current or ex partner: Not on file     Emotionally abused: Not on file     Physically abused: Not on file     Forced sexual activity: Not on file   Other Topics Concern     Parent/sibling w/ CABG, MI  or angioplasty before 65F 55M? Not Asked      Service No     Blood Transfusions No     Caffeine Concern No     Occupational Exposure No     Hobby Hazards No     Sleep Concern No     Stress Concern No     Weight Concern No     Special Diet No     Back Care No     Exercise No     Bike Helmet No     Seat Belt Yes     Self-Exams Yes   Social History Narrative     Not on file       Family History   Problem Relation Age of Onset     C.A.D. Mother 63     Hypertension Father      Cancer Father         bladder     C.A.D. Maternal Grandmother      C.A.D. Maternal Aunt      C.A.D. Maternal Uncle      Breast Cancer No family hx of      Cancer - colorectal No family hx of      Diabetes No family hx of          Review of Systems:  10 point ROS of systems including Constitutional, Eyes, Respiratory, Cardiovascular, Gastroenterology, Genitourinary, Integumentary, Muscularskeletal, Psychiatric were all negative except for pertinent positives noted in my HPI and in the PMH.          EXAM:  BP (!) 167/104 (BP Location: Right arm, Cuff Size: Adult Large)   Pulse 85   Wt 92.5 kg (204 lb)   LMP 02/28/2019 (Approximate)   SpO2 98%   BMI 35.65 kg/m    Body mass index is 35.65 kg/m .  General Appearance:  healthy, alert, active, no distress  Skin:  Normal skin turgor  Neuro:  Alert, cranial nerves grossly intact  HEENT: NCAT  Neck:  No masses or lesions carotids are +2/4. No bruits heard  Lungs:  Good respiratory effort   Abdomen: Soft, nontender.  Normal bowel sounds.  No masses  Vulva: No external lesions, normal hair distribution, no adenopathy  BUS:  Normal, no masses noted  Urethral meatus:  No masses noted  Urethra:  No hypermobility  Vagina: Moist, pink, no abnormal discharge, well rugated, no lesions  Cervix: Smooth, pink, no visible lesions  Uterus: Normal size, anteverted, non-tender, mobile  Ovaries: No mass, non-tender, mobile  Extremities:  No clubbing, cyanosis or edema.        ASSESSMENT:  Breakthrough bleeding  on Depo Provera  Tobacco use  Hypertension     PLAN:  We discussed the bleeding profiles as people age and approach menopause.  Even though menstrual changes and irregularities are common and expected, they may also indicate or precipitate endometrial abnormalities.  However, she has remained on the Depo Provera.  We reviewed the DUB with progestin therapy and the lining becomes thin and has little tissue to retract on itself and stop bleeding.  This may be treated with supplemental estrogen.  While her smoking and hypertension prohibits the use of combination contraception.   She may use supplemental, postmenopausal dosing to help decrease the breakthrough bleeding.   I would use the estrogen for 30 days.  If the bleeding stops, she may consider the refill in the future if the BTB occurs again.  If it continues, then we might need to consider increasing the estrogen.   The prescription is written for estrace 1 mg orally each day for 30 days.   If BTB continues, then she might need to consider the ablation, although the hormone therapy is the treatment of choice. The hysterectomy would be definitive therapy.   Questions seem to be answered.     Alok Maxwell MD

## 2019-05-08 NOTE — PROGRESS NOTES
"  SUBJECTIVE:   Elisabet Dominguez is a 49 year old female who presents to clinic today for the following   health issues:      Hypertension Follow-up      Outpatient blood pressures are not being checked.    Low Salt Diet: trying to follow a low salt diet      Amount of exercise or physical activity: None    Problems taking medications regularly: No    Medication side effects: none    Diet: regular (no restrictions)    Additional history: Patient taking no medication currently    Reviewed  and updated as needed this visit by clinical staff  Tobacco  Allergies  Meds  Med Hx  Surg Hx  Fam Hx  Soc Hx        ROS:  Constitutional, HEENT, cardiovascular, pulmonary, gi and gu systems are negative, except as otherwise noted.    OBJECTIVE:     /90   Pulse 88   Temp 97.7  F (36.5  C) (Oral)   Resp 18   Ht 1.609 m (5' 3.35\")   Wt 93.1 kg (205 lb 3.2 oz)   SpO2 98%   BMI 35.95 kg/m    Body mass index is 35.95 kg/m .  GENERAL: healthy, alert and no distress  NECK: no adenopathy, no asymmetry, masses, or scars, thyroid normal to palpation and no carotid bruits  RESP: lungs clear to auscultation - no rales, rhonchi or wheezes  CV: regular rate and rhythm, normal S1 S2, no S3 or S4, no murmur, click or rub, no peripheral edema and peripheral pulses strong  MS: no gross musculoskeletal defects noted, no edema  SKIN: no suspicious lesions or rashes    Diagnostic Test Results:  none     ASSESSMENT/PLAN:   1. HTN, goal below 140/90  - amLODIPine (NORVASC) 5 MG tablet; Take 1 tablet (5 mg) by mouth daily  Dispense: 30 tablet; Refill: 1    Use medication as directed.  BP checks 3-4 x per week  Follow up in 2-3 weeks  Patient amenable to this follow up plan.     Filippo Rueda PA-C  [facility]      "

## 2019-05-09 ENCOUNTER — OFFICE VISIT (OUTPATIENT)
Dept: FAMILY MEDICINE | Facility: CLINIC | Age: 49
End: 2019-05-09
Payer: COMMERCIAL

## 2019-05-09 VITALS
WEIGHT: 205.2 LBS | RESPIRATION RATE: 18 BRPM | TEMPERATURE: 97.7 F | SYSTOLIC BLOOD PRESSURE: 170 MMHG | BODY MASS INDEX: 36.36 KG/M2 | HEIGHT: 63 IN | OXYGEN SATURATION: 98 % | HEART RATE: 88 BPM | DIASTOLIC BLOOD PRESSURE: 90 MMHG

## 2019-05-09 DIAGNOSIS — I10 HTN, GOAL BELOW 140/90: Primary | ICD-10-CM

## 2019-05-09 PROCEDURE — 96372 THER/PROPH/DIAG INJ SC/IM: CPT | Performed by: PHYSICIAN ASSISTANT

## 2019-05-09 PROCEDURE — 99213 OFFICE O/P EST LOW 20 MIN: CPT | Mod: 25 | Performed by: PHYSICIAN ASSISTANT

## 2019-05-09 RX ORDER — AMLODIPINE BESYLATE 5 MG/1
5 TABLET ORAL DAILY
Qty: 30 TABLET | Refills: 1 | Status: SHIPPED | OUTPATIENT
Start: 2019-05-09 | End: 2019-08-09

## 2019-05-09 RX ADMIN — MEDROXYPROGESTERONE ACETATE 150 MG: 150 INJECTION, SUSPENSION INTRAMUSCULAR at 07:40

## 2019-05-09 ASSESSMENT — MIFFLIN-ST. JEOR: SCORE: 1535.4

## 2019-05-09 NOTE — PATIENT INSTRUCTIONS
Patient Education     What is High Blood Pressure?  High blood pressure (also called hypertension) is known as the  silent killer.  This is because most of the time it doesn t cause symptoms. In fact, many people don t know they have it until other problems develop. In most cases, high blood pressure often requires lifelong treatment.  Understanding blood pressure  The circulatory system is made up of the heart and blood vessels that carry blood through the body. Your heart is the pump for this system. With each heartbeat (contraction), the heart sends blood out through large blood vessels called arteries. Blood pressure is a measure of how hard the moving blood pushes against the walls of the arteries.  High blood pressure can harm your health  In a healthy blood vessel, the blood moves smoothly through the vessel and puts normal pressure on the vessel walls.       High blood pressure occurs when blood pushes too hard against artery walls. This causes damage to the artery walls and then the formation of scar tissue as it heals. This makes the arteries stiff and weak. Plaque sticks to the scarred tissue narrowing and hardening the arteries. High blood pressure also causes your heart to work harder to get blood out to the body. High blood pressure raises your risk of heart attack, also known as acute myocardial infarction, or AMI, heart failure, and stroke. It can also lead to kidney disease, and blindness. In general, if you have high blood pressure, keeping your blood pressure below 130/80 mmHg may help prevent these problems. Your healthcare provider may prescribe medicine to help control blood pressure if lifestyle changes are not enough.  It's important to know your blood pressure numbers. Blood pressure measurements are given as 2 numbers. Systolic blood pressure is the upper number. This is the pressure when the heart contracts. Diastolic blood pressure is the lower number. This is the pressure when the  "heart relaxes between beats.  Blood pressure is categorized as normal, elevated, or stage 1 or stage 2 high blood pressure:    Normal blood pressure is systolic of less than 120 and diastolic of less than 80 (120/80)    Elevated blood pressure is systolic of 120 to 129 and diastolic less than 80    Stage 1 high blood pressure is systolic is 130 to 139 or diastolic between 80 to 89    Stage 2 high blood pressure is when systolic is 140 or higher or the diastolic is 90 or higher  High blood pressure is diagnosed when multiple, separate readings show blood pressure above 130/80 mmHg. Talk with your healthcare provider if you have questions or concerns about your blood pressure readings.  Measuring blood pressure  An example of a blood pressure measurement is 120/70 (120 over 70). The top number is the pressure of blood against the artery walls during a heartbeat (systolic). The bottom number is the pressure of blood against artery walls between heartbeats (diastolic). Talk with your healthcare provider to find out what your blood pressure goals should be.   Controlling blood pressure  If your blood pressure is too high, work with your doctor on a plan for lowering it. Below are steps you can take that will help lower your blood pressure.    Choose heart-healthy foods. Eating healthier meals helps you control your blood pressure. Ask your doctor about the DASH eating plan. This plan helps reduce blood pressure by limiting the amount of sodium (salt) you have in your diet. DASH also encourages eating plenty of fruits and vegetables, low-fat or non-fat dairy, whole-grains, and foods high in fiber, and low in fat. This also provides an enhanced amount of potassium which can also help lower blood pressure.    Reduce sodium. Reducing sodium in your diet reduces fluid retention. Fluid retention caused by too much salt increases blood volume and blood pressure. The American Heart Association s (AHA) \"ideal\" sodium intake " recommendation is 1,500 milligrams per day.  However, since American's eat so much salt, the AHA says a positive change can occur by cutting back to even 2,400 milligrams of sodium a day.    Maintain a healthy weight. Being overweight makes you more likely to have high blood pressure. Losing excess weight helps lower blood pressure.    Exercise regularly. Daily exercise helps your heart and blood vessels work better and stay healthier. It can help lower your blood pressure.    Stop smoking. Smoking increases blood pressure and damages blood vessels.    Limit alcohol. Drinking too much alcohol can raise blood pressure. Men should have no more than 2 drinks a day. Women should have no more than 1. (A drink is equal to 1 beer, or a small glass of wine, or a shot of liquor.)    Control stress. Stress makes your heart work harder and beat faster. Controlling stress helps you control your blood pressure.  Facts about high blood pressure    Feeling OK does not mean that blood pressure is under control. Likewise, feeling bad doesn t mean it s out of control. The only way to know for sure is to check your pressure regularly.    Medicine is only one part of controlling high blood pressure. You also need to manage your weight, get regular exercise, and adjust your eating habits.    High blood pressure is usually a lifelong problem. But it can be controlled with healthy lifestyle changes and medicine.    Hypertension is not the same as stress. Although stress may be a factor in high blood pressure, it s only one part of the story.    Blood pressure medicines need to be taken every day. Stopping suddenly may cause a dangerous increase in pressure.   Date Last Reviewed: 4/27/2016 2000-2018 [employer]. [address] [place] [address], [place] [address]. All rights reserved. This information is not intended as a substitute for professional medical care. Always follow your healthcare professional's instructions.

## 2019-05-09 NOTE — NURSING NOTE
Prior to injection, verified patient identity using patient's name and date of birth.  Due to injection administration, patient instructed to remain in clinic for 15 minutes  afterwards, and to report any adverse reaction to me immediately.    Depo injection    BP: 170/90    LAST PAP/EXAM:   Lab Results   Component Value Date    PAP NIL 03/23/2018     URINE HCG:not indicated    NEXT INJECTION DUE: 7/25/19 - 8/8/19       Drug Amount Wasted:  None.  Vial/Syringe: Single dose vial  Expiration Date:  07/2020      The following medication was given:     MEDICATION: Depo Provera 150mg  ROUTE: IM  SITE: Deltoid - Right  : [employer]  LOT #: [id number]  EXP:07/2020  NEXT INJECTION DUE: 7/25/19 - 8/8/19   Provider: Dr. Chloé Owen MA

## 2019-06-25 ENCOUNTER — ANCILLARY PROCEDURE (OUTPATIENT)
Dept: MAMMOGRAPHY | Facility: CLINIC | Age: 49
End: 2019-06-25
Attending: FAMILY MEDICINE
Payer: COMMERCIAL

## 2019-06-25 DIAGNOSIS — Z12.31 VISIT FOR SCREENING MAMMOGRAM: ICD-10-CM

## 2019-06-25 PROCEDURE — 77063 BREAST TOMOSYNTHESIS BI: CPT | Mod: TC

## 2019-06-25 PROCEDURE — 77067 SCR MAMMO BI INCL CAD: CPT | Mod: TC

## 2019-07-26 ENCOUNTER — ALLIED HEALTH/NURSE VISIT (OUTPATIENT)
Dept: NURSING | Facility: CLINIC | Age: 49
End: 2019-07-26
Payer: COMMERCIAL

## 2019-07-26 PROCEDURE — 96372 THER/PROPH/DIAG INJ SC/IM: CPT

## 2019-07-26 RX ADMIN — MEDROXYPROGESTERONE ACETATE 150 MG: 150 INJECTION, SUSPENSION INTRAMUSCULAR at 07:19

## 2019-08-09 DIAGNOSIS — I10 HTN, GOAL BELOW 140/90: ICD-10-CM

## 2019-08-09 RX ORDER — AMLODIPINE BESYLATE 5 MG/1
TABLET ORAL
Qty: 30 TABLET | Refills: 0 | Status: SHIPPED | OUTPATIENT
Start: 2019-08-09 | End: 2019-09-09

## 2019-08-09 NOTE — TELEPHONE ENCOUNTER
Routing refill request to provider for review/approval because:  Labs not current:  Creatinine      Aleks Lerner RN, BSN, PHN

## 2019-08-09 NOTE — TELEPHONE ENCOUNTER
"Requested Prescriptions   Pending Prescriptions Disp Refills     amLODIPine (NORVASC) 5 MG tablet [Pharmacy Med Name: AMLODIPINE BESYLATE 5MG TABLETS] 30 tablet 0     Sig: TAKE 1 TABLET(5 MG) BY MOUTH DAILY       Calcium Channel Blockers Protocol  Failed - 8/9/2019  1:25 PM        Failed - Blood pressure under 140/90 in past 12 months     BP Readings from Last 3 Encounters:   05/09/19 170/90   04/30/19 (!) 167/104   02/21/19 132/82                 Failed - Normal serum creatinine on file in past 12 months     Recent Labs   Lab Test 06/07/18  0757   CR 0.62             Passed - Recent (12 mo) or future (30 days) visit within the authorizing provider's specialty     Patient had office visit in the last 12 months or has a visit in the next 30 days with authorizing provider or within the authorizing provider's specialty.  See \"Patient Info\" tab in inbasket, or \"Choose Columns\" in Meds & Orders section of the refill encounter.              Passed - Medication is active on med list        Passed - Patient is age 18 or older        Passed - No active pregnancy on record        Passed - No positive pregnancy test in past 12 months        Last Written Prescription Date:  05/09/19  Last Fill Quantity: 30,  # refills: 0   Last office visit: 5/9/2019 with prescribing provider:     Future Office Visit:      "

## 2019-09-04 ENCOUNTER — TELEPHONE (OUTPATIENT)
Dept: RHEUMATOLOGY | Facility: CLINIC | Age: 49
End: 2019-09-04

## 2019-09-04 ENCOUNTER — ANCILLARY PROCEDURE (OUTPATIENT)
Dept: GENERAL RADIOLOGY | Facility: CLINIC | Age: 49
End: 2019-09-04
Attending: INTERNAL MEDICINE
Payer: COMMERCIAL

## 2019-09-04 ENCOUNTER — OFFICE VISIT (OUTPATIENT)
Dept: RHEUMATOLOGY | Facility: CLINIC | Age: 49
End: 2019-09-04
Payer: COMMERCIAL

## 2019-09-04 VITALS
HEIGHT: 63 IN | SYSTOLIC BLOOD PRESSURE: 148 MMHG | WEIGHT: 202.4 LBS | DIASTOLIC BLOOD PRESSURE: 98 MMHG | HEART RATE: 90 BPM | OXYGEN SATURATION: 96 % | BODY MASS INDEX: 35.86 KG/M2

## 2019-09-04 DIAGNOSIS — M79.641 BILATERAL HAND PAIN: ICD-10-CM

## 2019-09-04 DIAGNOSIS — M15.4 EROSIVE OSTEOARTHRITIS: ICD-10-CM

## 2019-09-04 DIAGNOSIS — M79.642 BILATERAL HAND PAIN: ICD-10-CM

## 2019-09-04 DIAGNOSIS — M79.641 BILATERAL HAND PAIN: Primary | ICD-10-CM

## 2019-09-04 DIAGNOSIS — M79.642 BILATERAL HAND PAIN: Primary | ICD-10-CM

## 2019-09-04 PROCEDURE — 73130 X-RAY EXAM OF HAND: CPT | Mod: RT

## 2019-09-04 PROCEDURE — 99204 OFFICE O/P NEW MOD 45 MIN: CPT | Performed by: INTERNAL MEDICINE

## 2019-09-04 RX ORDER — HYDROXYCHLOROQUINE SULFATE 200 MG/1
200 TABLET, FILM COATED ORAL 2 TIMES DAILY
Qty: 60 TABLET | Refills: 4 | Status: SHIPPED | OUTPATIENT
Start: 2019-09-04 | End: 2021-03-17

## 2019-09-04 ASSESSMENT — MIFFLIN-ST. JEOR: SCORE: 1522.76

## 2019-09-04 NOTE — TELEPHONE ENCOUNTER
Rheumatology team: Please call to notify Ms. 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  Please assist her with scheduling a follow-up rheumatology appointment in 3-4 months.    Freddy Corrales MD  9/4/2019 12:01 PM

## 2019-09-04 NOTE — PROGRESS NOTES
Rheumatology Clinic Visit      Elisabet Dominguez MRN# [id number]   YOB: 1970 Age: 49 year old      Date of visit: 9/04/19   PCP: Dr. Chloé Wilder    Chief Complaint   Patient presents with:  Consult: Patient has lumps in her fingers, pain, achy and hard to open jars with hands, and knees did hurt. Hips crack a lot.    Assessment and Plan     1.  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  We reviewed the diagnosis of osteoarthritis and the natural disease course.  We discussed the benefits of medication such as Tylenol and/or NSAIDs; if NSAIDs are used and periodic lab monitoring will be needed and she verbalized understanding of this.  We also discussed the benefit of hand therapy.  Check x-rays today; if evidence for erosive osteoarthritis then will use hydroxychloroquine; we discussed that erosive osteoarthritis may or may not respond to treatment; and that the majority of patients do not respond to treatment, but if radiographic findings support this diagnosis that she would like to use hydroxychloroquine.  - hand therapy referral  - Hand x-rays today    Addendum: gull wing on x-ray; radiographic evidence to support erosive OA, so start hydroxychloroquine 200mg BID. Follow-up in 3-4 months. Telephone encounter placed to have Ms. Dominguez called.     # Hydroxychloroquine (Plaquenil) Risks and Benefits:  The risks and benefits of hydroxychloroquine were discussed in detail and the patient verbalized understanding; the patient also verbalized agreement to get the required ophthalmologic toxicity monitoring.  The risks discussed include, but are not limited to, the risk for hypersensitivity, anaphylaxis, anaphylactoid reactions, irreversible retinal damage, rare hematologic reactions, and rare cardiomyopathy.  Patients with G6PD deficiency or hepatic impairment may be at an increased risk for adverse effects.  I  encouraged reviewing the package insert and asking any questions about the medication.       # NSAIDs:  The risks and benefits of NSAIDs were discussed in detail and the patient verbalized understanding.  The risks discussed include, but are not limited to, the risk for hypersensitivity, anaphylaxis, GI upset, gastric ulcer, nephropathy, and an increased risk for cardiovascular events.     2. Cigarette Smoking:  Encouraged complete cessation.      3. Elevated blood pressure:  Elisabet to follow up with Primary Care provider regarding elevated blood pressure.     Ms. Dominguez verbalized agreement with and understanding of the rational for the diagnosis and treatment plan.  All questions were answered to best of my ability and the patient's satisfaction. Ms. Dominguez was advised to contact the clinic with any questions that may arise after the clinic visit.      Thank you for involving me in the care of the patient    Return to clinic: 3-4 months       HPI   Elisabet Dominguez is a 49 year old female with a past medical history significant for hypertension and obesity who presents for evaluation of arthritis.    Today, Ms. Dominguez reports that she wants her arthritis checked because there is arthritis in her family: mom and sister with OA, mother with gout.  Ms. Dominguez says that her PIPs and DIPs have bony knobbiness for years; some pain in these joints with increased activity; worse at the right 2nd PIP and DIPs. MCPs are okay.  Elbows and shoulders are okay.  Hips and knees bother her one point but do not now; she saw orthopedic surgery at the time where x-rays were reportedly done; asymptomatic at the hips and knees now.  Ankles and and MTPs without pain or swelling.  Her mother reported had tophi but no gouty arthropathy; Ms. Dominguez denies tophus. No morning stiffness. Feels okay in the AM; worse with activity. No morning stiffness.     Denies fevers, chills, nausea, vomiting, constipation, diarrhea. No abdominal pain. No chest  pain/pressure, palpitations, or shortness of breath. No LE swelling. No neck pain. No oral or nasal sores.  No rash.      Tobacco: few cigarettes daily   EtOH: occasional  Drugs: none  Occupation: accounting    ROS   GEN: No fevers, chills, night sweats, or weight change  SKIN: No itching, rashes, sores  HEENT: No epistaxis. No oral or nasal ulcers.  CV: No chest pain, pressure, palpitations, or dyspnea on exertion.  PULM: No SOB, wheeze, cough.  GI: No nausea, vomiting, constipation, diarrhea. No blood in stool. No abdominal pain.  : No blood in urine.  MSK: See HPI.  NEURO: No numbness, tingling, or weakness.  EXT: No LE swelling  PSYCH: Negative    Active Problem List     Patient Active Problem List   Diagnosis     CARDIOVASCULAR SCREENING; LDL GOAL LESS THAN 160     Obesity     Hypertension goal BP (blood pressure) < 140/90     BMI> 35     Past Medical History     Past Medical History:   Diagnosis Date     Human papillomavirus in conditions classified elsewhere and of unspecified site 1995    abnoraml pap-had Cryo     Hypertension goal BP (blood pressure) < 140/90      Obesity      Past Surgical History     Past Surgical History:   Procedure Laterality Date     CRYOTHERAPY, CERVICAL  1995     Allergy     Allergies   Allergen Reactions     Lisinopril Cough     Nkda [No Known Drug Allergies]      Current Medication List     Current Outpatient Medications   Medication Sig     amLODIPine (NORVASC) 5 MG tablet TAKE 1 TABLET(5 MG) BY MOUTH DAILY     estradiol (ESTRACE) 1 MG tablet Take 1 tablet (1 mg) by mouth daily     medroxyPROGESTERone (DEPO-PROVERA) 150 MG/ML injection Inject 1 mL (150 mg) into the muscle every 3 months     Current Facility-Administered Medications   Medication     medroxyPROGESTERone (DEPO-PROVERA) injection 150 mg         Social History   See HPI    Family History     Family History   Problem Relation Age of Onset     C.A.D. Mother 63     Arthritis Mother      Hypertension Father      Cancer  "Father         bladder     C.A.D. Maternal Grandmother      C.A.D. Maternal Aunt      C.A.D. Maternal Uncle      Breast Cancer No family hx of      Cancer - colorectal No family hx of      Diabetes No family hx of      See HPI    Physical Exam     Temp Readings from Last 3 Encounters:   05/09/19 97.7  F (36.5  C) (Oral)   06/28/18 97.3  F (36.3  C) (Oral)   06/07/18 98.1  F (36.7  C) (Oral)     BP Readings from Last 5 Encounters:   09/04/19 (!) 154/100   05/09/19 170/90   04/30/19 (!) 167/104   02/21/19 132/82   12/05/18 136/84     Pulse Readings from Last 1 Encounters:   09/04/19 90     Resp Readings from Last 1 Encounters:   05/09/19 18     Estimated body mass index is 35.46 kg/m  as calculated from the following:    Height as of this encounter: 1.609 m (5' 3.35\").    Weight as of this encounter: 91.8 kg (202 lb 6.4 oz).    GEN: NAD  HEENT: MMM. No oral lesions. Anicteric, noninjected sclera  CV: S1, S2. RRR. No m/r/g.  PULM: CTA bilaterally. No w/c.  MSK: Heberden's and Francisco's nodes present; nontender to palpation at the DIPs and PIPs.  MCPs without swelling or tenderness to palpation.  Wrists, elbows, shoulders, knees, ankles, and MTPs without swelling or tenderness palpation.     SKIN: No rash  EXT: No LE edema  PSYCH: Alert. Appropriate.    Labs / Imaging (select studies)     CBC  Recent Labs   Lab Test 03/23/18  1200   HGB 13.5     CMP  Recent Labs   Lab Test 06/28/18  0737 06/07/18  0757 09/12/12  1443   NA  --  141 141   POTASSIUM 4.4 3.9 4.2   CHLORIDE  --  108 103   CO2  --  22 20   ANIONGAP  --  11 17   GLC  --  92 83   BUN  --  9 8   CR  --  0.62 0.61   GFRESTIMATED  --  >90 >90   GFRESTBLACK  --  >90 >90   ISAAC  --  8.9 9.8     Calcium/VitaminD  Recent Labs   Lab Test 06/07/18  0757 09/12/12  1443   ISAAC 8.9 9.8     TSH/T4  Recent Labs   Lab Test 06/07/18  0757 03/23/18  1200 09/12/12  1443   TSH 0.73 1.12 1.21     Immunization History     Immunization History   Administered Date(s) Administered     " FLU 6-35 months 09/28/2015, 09/27/2017     Influenza (IIV3) PF 12/01/2014     TD (ADULT, 7+) 05/07/1997     TDAP Vaccine (Adacel) 06/14/2011          Chart documentation done in part with Dragon Voice recognition Software. Although reviewed after completion, some word and grammatical error may remain.    Freddy Corrales MD

## 2019-09-04 NOTE — TELEPHONE ENCOUNTER
1st attempt to contact Pt, l/m having Pt return call to clinic @ [phone] re: notifying Ms. 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  Please assist her with scheduling a follow-up rheumatology appointment in 3-4 months.    Nella Nye CMA  9/4/2019  3:16 PM

## 2019-09-04 NOTE — PATIENT INSTRUCTIONS
Rheumatology    Dr. Freddy Corrales         [facility]   (Monday)  [address]  [place] [address]       [facility]   (Tuesday)  [address]  [place] [address]    [facility]   (Wed., Thurs., and Friday)  [address] [place] [address]    Phone number: [phone]  Thank you for choosing [facility].  Julisa Soria CMA

## 2019-09-04 NOTE — NURSING NOTE
Elisabet to follow up with Primary Care provider regarding elevated blood pressure.  Blood pressure rechecked after visit    148/98  Julisa Soria CMA Rheumatology  9/4/2019 8:38 AM                                RAPID3 (0-30) Cumulative Score  4.0          RAPID3 Weighted Score (divide #4 by 3 and that is the weighted score)  1.3     Julisa Soria CMA Rheumatology  9/4/2019 8:04 AM

## 2019-09-05 NOTE — TELEPHONE ENCOUNTER
Contacted Pt, reviewed lab work re: x-rays showed findings consistent with erosive osteoarthritis so hydroxychloroquine 200mg twice daily has been prescribed.  Please schedule a follow-up in rheumatology appointment in 3-4 months.  Pt had no questions or concerns, agrees and understands.  Pt will call back to make follow up appointment.    Nella Nye CMA  9/5/2019  7:57 AM

## 2019-09-09 DIAGNOSIS — I10 HTN, GOAL BELOW 140/90: ICD-10-CM

## 2019-09-09 NOTE — TELEPHONE ENCOUNTER
"Routing refill request to provider for review/approval because:  Labs out of range:  BP  Labs not current:  Cr    Requested Prescriptions   Pending Prescriptions Disp Refills     amLODIPine (NORVASC) 5 MG tablet [Pharmacy Med Name: AMLODIPINE BESYLATE 5MG TABLETS] 30 tablet 0     Sig: TAKE 1 TABLET(5 MG) BY MOUTH DAILY       Calcium Channel Blockers Protocol  Failed - 9/9/2019  3:41 PM        Failed - Blood pressure under 140/90 in past 12 months     BP Readings from Last 3 Encounters:   09/04/19 (!) 148/98   05/09/19 170/90   04/30/19 (!) 167/104                 Failed - Normal serum creatinine on file in past 12 months     Recent Labs   Lab Test 06/07/18  0757   CR 0.62             Passed - Recent (12 mo) or future (30 days) visit within the authorizing provider's specialty     Patient had office visit in the last 12 months or has a visit in the next 30 days with authorizing provider or within the authorizing provider's specialty.  See \"Patient Info\" tab in inbasket, or \"Choose Columns\" in Meds & Orders section of the refill encounter.              Passed - Medication is active on med list        Passed - Patient is age 18 or older        Passed - No active pregnancy on record        Passed - No positive pregnancy test in past 12 months        Alina Adams RN    "

## 2019-09-10 RX ORDER — AMLODIPINE BESYLATE 5 MG/1
TABLET ORAL
Qty: 30 TABLET | Refills: 0 | Status: SHIPPED | OUTPATIENT
Start: 2019-09-10 | End: 2019-12-04

## 2019-09-12 NOTE — TELEPHONE ENCOUNTER
Left patient VM to return call to purple team to schedule for a physical  Hli DAVID Owen on 9/12/2019 at 10:02 AM

## 2019-09-13 DIAGNOSIS — N92.1 BREAKTHROUGH BLEEDING ON DEPO-PROVERA: ICD-10-CM

## 2019-09-13 NOTE — TELEPHONE ENCOUNTER
"Requested Prescriptions   Pending Prescriptions Disp Refills     estradiol (ESTRACE) 1 MG tablet 30 tablet 1     Sig: Take 1 tablet (1 mg) by mouth daily       Hormone Replacement Therapy Failed - 9/13/2019  3:00 PM        Failed - Blood pressure under 140/90 in past 12 months     BP Readings from Last 3 Encounters:   09/04/19 (!) 148/98   05/09/19 170/90   04/30/19 (!) 167/104                 Passed - Recent (12 mo) or future (30 days) visit within the authorizing provider's specialty     Patient had office visit in the last 12 months or has a visit in the next 30 days with authorizing provider or within the authorizing provider's specialty.  See \"Patient Info\" tab in inbasket, or \"Choose Columns\" in Meds & Orders section of the refill encounter.              Passed - Medication is active on med list        Passed - Patient is 18 years of age or older        Passed - No active pregnancy on record        Passed - No positive pregnancy test on record in past 12 months        Routing refill request to provider for review/approval because:  Labs out of range:  Blood pressures.    Judith Barajas RN on 9/13/2019 at 3:02 PM                  "

## 2019-09-13 NOTE — TELEPHONE ENCOUNTER
Discussed results and recommendations with patient.  She verbalized understanding and has no questions.    John Reese RN....9/13/2019 2:53 PM

## 2019-09-13 NOTE — TELEPHONE ENCOUNTER
Spoke with patient who is requesting a refill of estradiol which she would like sent to [facility] in [place]. Routed to OB team.    John Reese RN....9/13/2019 2:52 PM

## 2019-09-13 NOTE — TELEPHONE ENCOUNTER
Last written prescription date:  7/11/2019        Last fill quantity: 30, # refills: 1        Last office visit: 4/30/2019      Future office visit: Not scheduled        Is this a controlled substance?  Nicole Barajas RN on 9/13/2019 at 2:59 PM

## 2019-09-16 NOTE — TELEPHONE ENCOUNTER
Spoke to patient and she said she will call to schedule closer to the end of the month  Alessandro Owen CMA on 9/16/2019 at 1:52 PM

## 2019-09-18 RX ORDER — ESTRADIOL 1 MG/1
1 TABLET ORAL DAILY
Qty: 30 TABLET | Refills: 1 | Status: SHIPPED | OUTPATIENT
Start: 2019-09-18 | End: 2019-12-11

## 2019-10-14 ENCOUNTER — ALLIED HEALTH/NURSE VISIT (OUTPATIENT)
Dept: NURSING | Facility: CLINIC | Age: 49
End: 2019-10-14
Payer: COMMERCIAL

## 2019-10-14 DIAGNOSIS — Z30.9 CONTRACEPTIVE MANAGEMENT: Primary | ICD-10-CM

## 2019-10-14 PROCEDURE — 96372 THER/PROPH/DIAG INJ SC/IM: CPT

## 2019-10-14 RX ADMIN — MEDROXYPROGESTERONE ACETATE 150 MG: 150 INJECTION, SUSPENSION INTRAMUSCULAR at 12:04

## 2019-10-14 NOTE — PROGRESS NOTES
Clinic Administered Medication Documentation    MEDICATION LIST:   Depo Provera Documentation    Prior to injection, verified patient identity using patient's name and date of birth. Medication was administered. Please see MAR and medication order for additional information. Patient instructed to remain in clinic for 15 minutes and report any adverse reaction to staff immediately .    BP: Data Unavailable    LAST PAP/EXAM:   Lab Results   Component Value Date    PAP NIL 03/23/2018     URINE HCG:not indicated    NEXT INJECTION DUE: 12/30/19 - 1/13/20    Was entire vial of medication used? Yes  Vial/Syringe: Single dose vial  Expiration Date:  01/2021    The following medication was given:     MEDICATION: Medroxyprogesterone 150 mg  ROUTE: IM  SITE: Deltoid - Right  DOSE: 1 mL  LOT #: [id number]  :  Rebecca  EXPIRATION DATE:  01/2021  NDC#: 41360-241-96  Ellie HERNÁNDEZ CMA ([facility])

## 2019-10-22 DIAGNOSIS — I10 HTN, GOAL BELOW 140/90: ICD-10-CM

## 2019-10-22 NOTE — TELEPHONE ENCOUNTER
Left patient VM to return call to red team to schedule for BP check   Alessandro Owen CMA on 10/22/2019 at 8:26 AM

## 2019-10-24 NOTE — TELEPHONE ENCOUNTER
2nd attempt. Left patient VM to return call to red team to schedule for BP check   Alessandro Owen CMA on 10/24/2019 at 9:26 AM

## 2019-10-29 RX ORDER — AMLODIPINE BESYLATE 5 MG/1
TABLET ORAL
Qty: 30 TABLET | Refills: 0
Start: 2019-10-29

## 2019-10-29 NOTE — TELEPHONE ENCOUNTER
3rd attempt. Left patient VM to return call to red team to schedule for BP check. Please close chart due to many attempts to reach patient  Alessandro Owen CMA on 10/29/2019 at 9:42 AM

## 2019-12-04 ENCOUNTER — OFFICE VISIT (OUTPATIENT)
Dept: FAMILY MEDICINE | Facility: CLINIC | Age: 49
End: 2019-12-04
Payer: COMMERCIAL

## 2019-12-04 VITALS
DIASTOLIC BLOOD PRESSURE: 90 MMHG | TEMPERATURE: 97.8 F | HEART RATE: 106 BPM | BODY MASS INDEX: 35.26 KG/M2 | HEIGHT: 63 IN | SYSTOLIC BLOOD PRESSURE: 180 MMHG | WEIGHT: 199 LBS | OXYGEN SATURATION: 95 %

## 2019-12-04 DIAGNOSIS — Z72.0 TOBACCO ABUSE: ICD-10-CM

## 2019-12-04 DIAGNOSIS — E66.09 OBESITY DUE TO EXCESS CALORIES WITHOUT SERIOUS COMORBIDITY, UNSPECIFIED CLASSIFICATION: ICD-10-CM

## 2019-12-04 DIAGNOSIS — I10 ESSENTIAL HYPERTENSION: Primary | ICD-10-CM

## 2019-12-04 PROCEDURE — 99214 OFFICE O/P EST MOD 30 MIN: CPT | Performed by: FAMILY MEDICINE

## 2019-12-04 RX ORDER — LOSARTAN POTASSIUM AND HYDROCHLOROTHIAZIDE 25; 100 MG/1; MG/1
1 TABLET ORAL DAILY
Qty: 30 TABLET | Refills: 0 | Status: SHIPPED | OUTPATIENT
Start: 2019-12-04 | End: 2020-01-03

## 2019-12-04 RX ORDER — AMLODIPINE BESYLATE 10 MG/1
10 TABLET ORAL AT BEDTIME
Qty: 30 TABLET | Refills: 0 | Status: SHIPPED | OUTPATIENT
Start: 2019-12-04 | End: 2020-01-03

## 2019-12-04 ASSESSMENT — MIFFLIN-ST. JEOR: SCORE: 1501.79

## 2019-12-04 ASSESSMENT — PAIN SCALES - GENERAL: PAINLEVEL: NO PAIN (0)

## 2019-12-04 NOTE — PATIENT INSTRUCTIONS
Patient Education     Staying Smoke-Free     Deep breathing can help ease the urge to smoke.     Quitting smoking is a big change. People will congratulate you. You have the right to be proud. But later at times you may miss smoking. Plan ahead to resist temptation.  Prepare to be tempted    If you feel the urge to smoke, distract yourself for about 5 minutes. Drink water. Call a friend, walk around the room, or try deep breathing. Usually, the urge to smoke will pass.    Don t trust yourself to have  just one cigarette.  Many ex-smokers get hooked again that way.    Remind yourself why you quit. Tell yourself you can stay quit.    Avoid people or places that can trigger you to smoke. Ask others not to smoke in your home or car.    Spend time in places where you can t smoke-- a museum, a library, a store, or a gym.    Take your nonsmoking life one day at a time. Sage each day on your calendar.    HALT your desire. Keep yourself from feeling too Hungry, Angry, Lonely, or Tired. Deal with your real needs. Eat, talk, or sleep.    Put aside cigarette money and reward yourself.  If you slip  You may slip and smoke again. Many ex-smokers slip on the way to success. If you do, it s not the end of your quit process. Think about what triggered you to smoke. Then think of ways to prevent future slips. Ask yourself what you can learn from the slip. Decide how you will handle this trigger better in the future. Then get back on track--right away!  Don t give up  Keep telling yourself you re no longer a smoker. Don t lose hope. Most people have tried to quit several times before being successful. Try to stay focused on your plan to be smoke-free. Keep in mind all the benefits of staying quit. Millions of people have given up smoking. You can too.        For more information    https://smokefree.gov/dvpw-mi-zv-expert    National Cancer [place] Smoking Quitline: 877-44U-QUIT (076-789-0090)      Date Last Reviewed: 2/1/2017     9467-3941 [employer]. [address] [place] [address], [place] [address]. All rights reserved. This information is not intended as a substitute for professional medical care. Always follow your healthcare professional's instructions.           Patient Education     Understanding Smokeless Tobacco   Smokeless tobacco products are made from the tobacco plant. They are harmful to your body. Smokeless tobacco causes oral cancer, esophageal cancer, and pancreatic cancer. These products are not safer than other forms of tobacco. Tobacco is not safe in any form.   Smokeless tobacco is used by about 7 in 100 [place] adults. Most users are men. About 1 in 10 adolescent boys also use it.   What is smokeless tobacco?  Smokeless tobacco comes in 3 types. These are chewing tobacco, snuff, and dissolvables. Many of these products are flavored to make them more appealing.   Snuff is the most widely used kind of smokeless tobacco. This finely ground tobacco can be dry or moist. The dry powder is sniffed. Moist snuff is put in the mouth between the gums and cheeks. Often packaged in round cans, it s called dip. Snuff also includes snus. This is moist tobacco sold in small pouches.   Chewing tobacco--or chew--is sold as loose leaves, plugs, or twists. A piece is pressed between the gums and cheek. Dissolvables are tobacco that has been made into small shapes, like lozenges or tablets. They are often sucked on until they melt.   How is smokeless tobacco bad for you?  All tobacco products contain dangerous chemicals. Tobacco use is not safe in any amount or in any form.   All forms of tobacco have nicotine in them. Nicotine is very addictive. You can become physically and emotionally hooked on it. You may even crave it. In children, nicotine can harm the developing brain.   Smokeless tobacco also has at least 30 harmful chemicals. The worst are tobacco-specific nitrosamines. These come from the way tobacco is processed. You may also  be exposed to other chemicals like lead and mercury. All of these have been linked to cancer. If you use smokeless tobacco, you are more at risk for cancer of the mouth, esophagus, and pancreas.   Smokeless tobacco may also lead to other health problems. These include:    Heart disease and stroke    Small white patches in the mouth that can turn into cancer (leukoplakia)    Gum disease, tooth decay, and tooth loss    Stained teeth and bad breath    Early birth or miscarriage if used while pregnant    How can you quit smokeless tobacco?  Quitting any form of tobacco can be hard to do. The nicotine can cause physical, mental, and emotional withdrawal symptoms. You may have headaches, depression, and trouble sleeping. You may feel anxious, angry, tired, or restless. You may also feel the need to put something in your mouth to replace the chew, dip, or other smokeless tobacco product.  If you want to quit, talk with your healthcare provider, pharmacist, or dentist. He or she can tell you about all your options. Many of the same tactics people use to quit cigarettes may help you. You may want to try a support group, a tobacco cessation program, medicine, or nicotine replacement therapy. Many resources are also available through your smart phone.      More resources    American Lung Association    [facility]    National Cancer [place]   Date Last Reviewed: 5/1/2017 2000-2018 [employer]. [address] [place] [address], [place] [address]. All rights reserved. This information is not intended as a substitute for professional medical care. Always follow your healthcare professional's instructions.           Patient Education     Low-Salt Diet  This diet removes foods that are high in salt. It also limits the amount of salt you use when cooking. It is most often used for people with high blood pressure, edema (fluid retention), and kidney, liver, or heart disease.  Table salt contains the mineral sodium. Your body needs  sodium to work normally. But too much sodium can make your health problems worse. Your healthcare provider is recommending a low-salt (also called low-sodium) diet for you. Your total daily allowance of salt is 1,500 to 2,300 milligrams (mg). It is less than 1 teaspoon of table salt. This means you can have only about 500 to 700 mg of sodium at each meal. People with certain health problems should limit salt intake to the lower end of the recommended range.    When you cook, don t add much salt. If you can cook without using salt, even better. Don t add salt to your food at the table.  When shopping, read food labels. Salt is often called sodium on the label. Choose foods that are salt-free, low salt, or very low salt. Note that foods with reduced salt may not lower your salt intake enough.    Beans, potatoes, and pasta  Ok: Dry beans, split peas, lentils, potatoes, rice, macaroni, pasta, spaghetti without added salt  Avoid: Potato chips, tortilla chips, and similar products  Breads and cereals  Ok: Low-sodium breads, rolls, cereals, and cakes; low-salt crackers, matzo crackers  Avoid: Salted crackers, pretzels, popcorn, Filipino toast, pancakes, muffins  Dairy  Ok: Milk, chocolate milk, hot chocolate mix, low-salt cheeses, and yogurt  Avoid: Processed cheese and cheese spreads; Roquefort, Camembert, and cottage cheese; buttermilk, instant breakfast drink  Desserts  Ok: Ice cream, frozen yogurt, juice bars, gelatin, cookies and pies, sugar, honey, jelly, hard candy  Avoid: Most pies, cakes and cookies prepared or processed with salt; instant pudding  Drinks  Ok: Tea, coffee, fizzy (carbonated) drinks, juices  Avoid: Flavored coffees, electrolyte replacement drinks, sports drinks  Meats  Ok: All fresh meat, fish, poultry, low-salt tuna, eggs, egg substitute  Avoid: Smoked, pickled, brine-cured, or salted meats and fish. This includes simon, chipped beef, corned beef, hot dogs, deli meats, ham, kosher meats, salt pork,  sausage, canned tuna, salted codfish, smoked salmon, herring, sardines, or anchovies.  Seasonings and spices  Ok: Most seasonings are okay. Good substitutes for salt include: fresh herb blends, hot sauce, lemon, garlic, mathew, vinegar, dry mustard, parsley, cilantro, horseradish, tomato paste, regular margarine, mayonnaise, unsalted butter, cream cheese, vegetable oil, cream, low-salt salad dressing and gravy.  Avoid: Regular ketchup, relishes, pickles, soy sauce, teriyaki sauce, Worcestershire sauce, BBQ sauce, tartar sauce, meat tenderizer, chili sauce, regular gravy, regular salad dressing, salted butter  Soups  Ok: Low-salt soups and broths made with allowed foods  Avoid: Bouillon cubes, soups with smoked or salted meats, regular soup and broth  Vegetables  Ok: Most vegetables are okay; also low-salt tomato and vegetable juices  Avoid: Sauerkraut and other brine-soaked vegetables; pickles and other pickled vegetables; tomato juice, olives  Date Last Reviewed: 8/1/2016 2000-2018 [employer]. [address] [place] [address], [place] [address]. All rights reserved. This information is not intended as a substitute for professional medical care. Always follow your healthcare professional's instructions.

## 2019-12-04 NOTE — PROGRESS NOTES
Subjective     Elisabet Dominguez is a 49 year old female who presents to clinic today for the following health issues:    HPI :  Patient comes in today with history of hypertension.  She reports blood pressure remained to be elevated she denies headache, denies chest pain, previously she was on amlodipine 5 mg, in addition, she tried hydrochlorothiazide.  She reports none of these medication helped her blood pressure.    She reports both of her parents have hypertension.  She does not smoke.  She is morbid obese.    Denies chest pain, denies headache, denies lower extremity edema denies being lightheaded or dizzy.    Patient denies history of snoring denies history of sleep apnea denies panic attack.    Medication Followup of blood pressure    Taking Medication as prescribed: NO    Side Effects:  None    Medication Helping Symptoms:  NO       Patient Active Problem List   Diagnosis     CARDIOVASCULAR SCREENING; LDL GOAL LESS THAN 160     Obesity     Hypertension goal BP (blood pressure) < 140/90     BMI> 35     Past Surgical History:   Procedure Laterality Date     CRYOTHERAPY, CERVICAL  1995       Social History     Tobacco Use     Smoking status: Current Every Day Smoker     Packs/day: 0.02     Years: 20.00     Pack years: 0.40     Types: Cigarettes     Smokeless tobacco: Never Used     Tobacco comment: 2 per day   Substance Use Topics     Alcohol use: Yes     Alcohol/week: 2.0 standard drinks     Types: 2 Standard drinks or equivalent per week     Family History   Problem Relation Age of Onset     C.A.D. Mother 63     Arthritis Mother      Hypertension Father      Cancer Father         bladder     C.A.D. Maternal Grandmother      C.A.D. Maternal Aunt      C.A.D. Maternal Uncle      Breast Cancer No family hx of      Cancer - colorectal No family hx of      Diabetes No family hx of          Current Outpatient Medications   Medication Sig Dispense Refill     amLODIPine (NORVASC) 10 MG tablet Take 1 tablet (10 mg) by  mouth At Bedtime 30 tablet 0     estradiol (ESTRACE) 1 MG tablet Take 1 tablet (1 mg) by mouth daily Schedule blood pressure follow up with PCP, prior to further refills 30 tablet 1     losartan-hydrochlorothiazide (HYZAAR) 100-25 MG tablet Take 1 tablet by mouth daily 30 tablet 0     medroxyPROGESTERone (DEPO-PROVERA) 150 MG/ML injection Inject 1 mL (150 mg) into the muscle every 3 months 3 mL 3     hydroxychloroquine (PLAQUENIL) 200 MG tablet Take 1 tablet (200 mg) by mouth 2 times daily (Patient not taking: Reported on 12/4/2019) 60 tablet 4     Allergies   Allergen Reactions     Lisinopril Cough       Reviewed and updated as needed this visit by Provider         Review of Systems   ROS COMP: Constitutional, HEENT, cardiovascular, pulmonary, gi and gu systems are negative, except as otherwise noted.      Objective    There were no vitals taken for this visit.  There is no height or weight on file to calculate BMI.  Physical Exam   GENERAL: healthy, alert and no distress  NECK: no adenopathy, no asymmetry, masses, or scars and thyroid normal to palpation  RESP: lungs clear to auscultation - no rales, rhonchi or wheezes  CV: regular rate and rhythm, normal S1 S2, no S3 or S4, no murmur, click or rub, no peripheral edema and peripheral pulses strong  MS: no gross musculoskeletal defects noted, no edema  PSYCH: mentation appears normal, affect normal/bright    Diagnostic Test Results:  Labs reviewed in Epic  none         Assessment & Plan     1. Essential hypertension  Advised diet patient with diet modification, eat more healthy food.  She was advised to reduce her salt intake, eat more fruit, vegetable.  Advised diet controlled, with good exercise.  In addition, she was advised to cut down on her tobacco use.  - amLODIPine (NORVASC) 10 MG tablet; Take 1 tablet (10 mg) by mouth At Bedtime  Dispense: 30 tablet; Refill: 0  - losartan-hydrochlorothiazide (HYZAAR) 100-25 MG tablet; Take 1 tablet by mouth daily   "Dispense: 30 tablet; Refill: 0    2. Obesity due to excess calories without serious comorbidity, unspecified classification  Patient was advised with diet modification, healthy diet and weight loss    3. Tobacco abuse  Advised patient to quit smoking however, she is not ready at this point.       Tobacco Cessation:   reports that she has been smoking cigarettes. She has a 0.40 pack-year smoking history. She has never used smokeless tobacco.  Tobacco Cessation Action Plan: Self help information given to patient      BMI:   Estimated body mass index is 35.25 kg/m  as calculated from the following:    Height as of this encounter: 1.6 m (5' 3\").    Weight as of this encounter: 90.3 kg (199 lb).   Weight management plan: Discussed healthy diet and exercise guidelines        Patient Instructions       Patient Education     Staying Smoke-Free     Deep breathing can help ease the urge to smoke.     Quitting smoking is a big change. People will congratulate you. You have the right to be proud. But later at times you may miss smoking. Plan ahead to resist temptation.  Prepare to be tempted    If you feel the urge to smoke, distract yourself for about 5 minutes. Drink water. Call a friend, walk around the room, or try deep breathing. Usually, the urge to smoke will pass.    Don t trust yourself to have  just one cigarette.  Many ex-smokers get hooked again that way.    Remind yourself why you quit. Tell yourself you can stay quit.    Avoid people or places that can trigger you to smoke. Ask others not to smoke in your home or car.    Spend time in places where you can t smoke-- a museum, a library, a store, or a gym.    Take your nonsmoking life one day at a time. Sage each day on your calendar.    HALT your desire. Keep yourself from feeling too Hungry, Angry, Lonely, or Tired. Deal with your real needs. Eat, talk, or sleep.    Put aside cigarette money and reward yourself.  If you slip  You may slip and smoke again. Many " ex-smokers slip on the way to success. If you do, it s not the end of your quit process. Think about what triggered you to smoke. Then think of ways to prevent future slips. Ask yourself what you can learn from the slip. Decide how you will handle this trigger better in the future. Then get back on track--right away!  Don t give up  Keep telling yourself you re no longer a smoker. Don t lose hope. Most people have tried to quit several times before being successful. Try to stay focused on your plan to be smoke-free. Keep in mind all the benefits of staying quit. Millions of people have given up smoking. You can too.        For more information    https://smokefree.gov/uglm-uw-yw-expert    National Cancer [place] Smoking Quitline: 877-44U-QUIT (470-387-4526)      Date Last Reviewed: 2/1/2017 2000-2018 [employer]. [address] [place] [address]. All rights reserved. This information is not intended as a substitute for professional medical care. Always follow your healthcare professional's instructions.           Patient Education     Understanding Smokeless Tobacco   Smokeless tobacco products are made from the tobacco plant. They are harmful to your body. Smokeless tobacco causes oral cancer, esophageal cancer, and pancreatic cancer. These products are not safer than other forms of tobacco. Tobacco is not safe in any form.   Smokeless tobacco is used by about 7 in 100 [place] adults. Most users are men. About 1 in 10 adolescent boys also use it.   What is smokeless tobacco?  Smokeless tobacco comes in 3 types. These are chewing tobacco, snuff, and dissolvables. Many of these products are flavored to make them more appealing.   Snuff is the most widely used kind of smokeless tobacco. This finely ground tobacco can be dry or moist. The dry powder is sniffed. Moist snuff is put in the mouth between the gums and cheeks. Often packaged in round cans, it s called dip. Snuff also includes snus.  This is moist tobacco sold in small pouches.   Chewing tobacco--or chew--is sold as loose leaves, plugs, or twists. A piece is pressed between the gums and cheek. Dissolvables are tobacco that has been made into small shapes, like lozenges or tablets. They are often sucked on until they melt.   How is smokeless tobacco bad for you?  All tobacco products contain dangerous chemicals. Tobacco use is not safe in any amount or in any form.   All forms of tobacco have nicotine in them. Nicotine is very addictive. You can become physically and emotionally hooked on it. You may even crave it. In children, nicotine can harm the developing brain.   Smokeless tobacco also has at least 30 harmful chemicals. The worst are tobacco-specific nitrosamines. These come from the way tobacco is processed. You may also be exposed to other chemicals like lead and mercury. All of these have been linked to cancer. If you use smokeless tobacco, you are more at risk for cancer of the mouth, esophagus, and pancreas.   Smokeless tobacco may also lead to other health problems. These include:    Heart disease and stroke    Small white patches in the mouth that can turn into cancer (leukoplakia)    Gum disease, tooth decay, and tooth loss    Stained teeth and bad breath    Early birth or miscarriage if used while pregnant    How can you quit smokeless tobacco?  Quitting any form of tobacco can be hard to do. The nicotine can cause physical, mental, and emotional withdrawal symptoms. You may have headaches, depression, and trouble sleeping. You may feel anxious, angry, tired, or restless. You may also feel the need to put something in your mouth to replace the chew, dip, or other smokeless tobacco product.  If you want to quit, talk with your healthcare provider, pharmacist, or dentist. He or she can tell you about all your options. Many of the same tactics people use to quit cigarettes may help you. You may want to try a support group, a tobacco  cessation program, medicine, or nicotine replacement therapy. Many resources are also available through your smart phone.      More resources    American Lung Association    [facility]    National Cancer [place]   Date Last Reviewed: 5/1/2017 2000-2018 [employer]. [address] [place] [address], [place] [address]. All rights reserved. This information is not intended as a substitute for professional medical care. Always follow your healthcare professional's instructions.           Patient Education     Low-Salt Diet  This diet removes foods that are high in salt. It also limits the amount of salt you use when cooking. It is most often used for people with high blood pressure, edema (fluid retention), and kidney, liver, or heart disease.  Table salt contains the mineral sodium. Your body needs sodium to work normally. But too much sodium can make your health problems worse. Your healthcare provider is recommending a low-salt (also called low-sodium) diet for you. Your total daily allowance of salt is 1,500 to 2,300 milligrams (mg). It is less than 1 teaspoon of table salt. This means you can have only about 500 to 700 mg of sodium at each meal. People with certain health problems should limit salt intake to the lower end of the recommended range.    When you cook, don t add much salt. If you can cook without using salt, even better. Don t add salt to your food at the table.  When shopping, read food labels. Salt is often called sodium on the label. Choose foods that are salt-free, low salt, or very low salt. Note that foods with reduced salt may not lower your salt intake enough.    Beans, potatoes, and pasta  Ok: Dry beans, split peas, lentils, potatoes, rice, macaroni, pasta, spaghetti without added salt  Avoid: Potato chips, tortilla chips, and similar products  Breads and cereals  Ok: Low-sodium breads, rolls, cereals, and cakes; low-salt crackers, matzo crackers  Avoid: Salted crackers, pretzels, popcorn,  Mongolian toast, pancakes, muffins  Dairy  Ok: Milk, chocolate milk, hot chocolate mix, low-salt cheeses, and yogurt  Avoid: Processed cheese and cheese spreads; Roquefort, Camembert, and cottage cheese; buttermilk, instant breakfast drink  Desserts  Ok: Ice cream, frozen yogurt, juice bars, gelatin, cookies and pies, sugar, honey, jelly, hard candy  Avoid: Most pies, cakes and cookies prepared or processed with salt; instant pudding  Drinks  Ok: Tea, coffee, fizzy (carbonated) drinks, juices  Avoid: Flavored coffees, electrolyte replacement drinks, sports drinks  Meats  Ok: All fresh meat, fish, poultry, low-salt tuna, eggs, egg substitute  Avoid: Smoked, pickled, brine-cured, or salted meats and fish. This includes simon, chipped beef, corned beef, hot dogs, deli meats, ham, kosher meats, salt pork, sausage, canned tuna, salted codfish, smoked salmon, herring, sardines, or anchovies.  Seasonings and spices  Ok: Most seasonings are okay. Good substitutes for salt include: fresh herb blends, hot sauce, lemon, garlic, mathew, vinegar, dry mustard, parsley, cilantro, horseradish, tomato paste, regular margarine, mayonnaise, unsalted butter, cream cheese, vegetable oil, cream, low-salt salad dressing and gravy.  Avoid: Regular ketchup, relishes, pickles, soy sauce, teriyaki sauce, Worcestershire sauce, BBQ sauce, tartar sauce, meat tenderizer, chili sauce, regular gravy, regular salad dressing, salted butter  Soups  Ok: Low-salt soups and broths made with allowed foods  Avoid: Bouillon cubes, soups with smoked or salted meats, regular soup and broth  Vegetables  Ok: Most vegetables are okay; also low-salt tomato and vegetable juices  Avoid: Sauerkraut and other brine-soaked vegetables; pickles and other pickled vegetables; tomato juice, olives  Date Last Reviewed: 8/1/2016 2000-2018 [employer]. [address] [place] [address], [place] [address]. All rights reserved. This information is not intended as a substitute  for professional medical care. Always follow your healthcare professional's instructions.               Return in about 2 weeks (around 12/18/2019).    Dea Chávez MD  [facility]

## 2019-12-06 DIAGNOSIS — N92.1 BREAKTHROUGH BLEEDING ON DEPO-PROVERA: ICD-10-CM

## 2019-12-06 NOTE — TELEPHONE ENCOUNTER
Reason for Call:  Medication or medication refill:    Do you use a [facility]?  Name of the pharmacy and phone number for the current request:    [id number] [address] [place] [address]  Name of the medication requested: estradiol (ESTRACE) 1 MG     Other request:     Can we leave a detailed message on this number? YES    Phone number patient can be reached at: Home number on file [phone] (home)    Best Time: any    Call taken on 12/6/2019 at 9:32 AM by Kate Ashby

## 2019-12-06 NOTE — TELEPHONE ENCOUNTER
"Hormone Replacement Therapy Failed   estradiol (ESTRACE) 1 MG tablet   Rerun Protocol (12/6/2019 1:06 PM)      Blood pressure under 140/90 in past 12 months          BP Readings from Last 3 Encounters:   12/04/19 (!) 180/90   09/04/19 (!) 148/98   05/09/19 170/90                Recent (12 mo) or future (30 days) visit within the authorizing provider's specialty      Patient has had an office visit with the authorizing provider or a provider within the authorizing providers department within the previous 12 mos or has a future within next 30 days. See \"Patient Info\" tab in inbasket, or \"Choose Columns\" in Meds & Orders section of the refill encounter.              Medication is active on med list         Patient is 18 years of age or older         No active pregnancy on record         No positive pregnancy test on record in past 12 months        Routing refill request to provider for review/approval because:  Last 2 BPs were >140/90.    Ashley Lin RN          "

## 2019-12-11 RX ORDER — ESTRADIOL 1 MG/1
1 TABLET ORAL DAILY
Qty: 30 TABLET | Refills: 1 | Status: SHIPPED | OUTPATIENT
Start: 2019-12-11 | End: 2020-02-10

## 2019-12-30 ENCOUNTER — ALLIED HEALTH/NURSE VISIT (OUTPATIENT)
Dept: NURSING | Facility: CLINIC | Age: 49
End: 2019-12-30
Payer: COMMERCIAL

## 2019-12-30 DIAGNOSIS — Z30.9 CONTRACEPTIVE MANAGEMENT: Primary | ICD-10-CM

## 2019-12-30 PROCEDURE — 96372 THER/PROPH/DIAG INJ SC/IM: CPT

## 2019-12-30 RX ADMIN — MEDROXYPROGESTERONE ACETATE 150 MG: 150 INJECTION, SUSPENSION INTRAMUSCULAR at 11:41

## 2019-12-30 NOTE — PROGRESS NOTES
Clinic Administered Medication Documentation    MEDICATION LIST:   Depo Provera Documentation    Prior to injection, verified patient identity using patient's name and date of birth. Medication was administered. Please see MAR and medication order for additional information. Patient instructed to remain in clinic for 15 minutes and report any adverse reaction to staff immediately .    BP: Data Unavailable    LAST PAP/EXAM:   Lab Results   Component Value Date    PAP NIL 03/23/2018     URINE HCG:not indicated    NEXT INJECTION DUE: 3/16/20 - 3/30/20    Was entire vial of medication used? Yes  Vial/Syringe: Single dose vial  Expiration Date:  03/2021  Left deltoid.  Ellie HERNÁNDEZ CMA ([facility])

## 2020-01-02 ENCOUNTER — TELEPHONE (OUTPATIENT)
Dept: FAMILY MEDICINE | Facility: CLINIC | Age: 50
End: 2020-01-02

## 2020-01-02 DIAGNOSIS — I10 ESSENTIAL HYPERTENSION: ICD-10-CM

## 2020-01-02 NOTE — TELEPHONE ENCOUNTER
"Requested Prescriptions   Pending Prescriptions Disp Refills     losartan-hydrochlorothiazide (HYZAAR) 100-25 MG tablet 30 tablet 0     Sig: Take 1 tablet by mouth daily   Last Written Prescription Date:  12/4/19  Last Fill Quantity: 30,  # refills: 0   Last office visit: 12/4/2019 with prescribing provider:     Future Office Visit:   Next 5 appointments (look out 90 days)    Jan 03, 2020 11:00 AM CST  [facility] [address] [address]  [phone]             Angiotensin-II Receptors Failed - 1/2/2020 11:12 AM        Failed - Last blood pressure under 140/90 in past 12 months     BP Readings from Last 3 Encounters:   12/04/19 (!) 180/90   09/04/19 (!) 148/98   05/09/19 170/90                 Failed - Normal serum creatinine on file in past 12 months     Recent Labs   Lab Test 06/07/18  0757   CR 0.62             Failed - Normal serum potassium on file in past 12 months     Recent Labs   Lab Test 06/28/18  0737   POTASSIUM 4.4                    Passed - Recent (12 mo) or future (30 days) visit within the authorizing provider's specialty     Patient has had an office visit with the authorizing provider or a provider within the authorizing providers department within the previous 12 mos or has a future within next 30 days. See \"Patient Info\" tab in inbasket, or \"Choose Columns\" in Meds & Orders section of the refill encounter.              Passed - Medication is active on med list        Passed - Patient is age 18 or older        Passed - No active pregnancy on record        Passed - No positive pregnancy test in past 12 months          "

## 2020-01-02 NOTE — TELEPHONE ENCOUNTER
Requested Prescriptions   Pending Prescriptions Disp Refills     amLODIPine (NORVASC) 10 MG tablet 30 tablet 0     Sig: Take 1 tablet (10 mg) by mouth At Bedtime       There is no refill protocol information for this order   Last Written Prescription Date:  12/4/19  Last Fill Quantity: 30,  # refills: 0   Last office visit: 12/4/2019 with prescribing provider:     Future Office Visit:   Next 5 appointments (look out 90 days)    Jan 03, 2020 11:00 AM CST  Office Visit with Joss iHnson MD  [facility] [place] ([facility]) [address]  [place] [address] [phone]

## 2020-01-03 RX ORDER — AMLODIPINE BESYLATE 10 MG/1
10 TABLET ORAL AT BEDTIME
Qty: 30 TABLET | Refills: 0 | Status: SHIPPED | OUTPATIENT
Start: 2020-01-03 | End: 2020-03-03

## 2020-01-03 RX ORDER — LOSARTAN POTASSIUM AND HYDROCHLOROTHIAZIDE 25; 100 MG/1; MG/1
1 TABLET ORAL DAILY
Qty: 30 TABLET | Refills: 0 | Status: SHIPPED | OUTPATIENT
Start: 2020-01-03 | End: 2020-03-03

## 2020-01-03 NOTE — TELEPHONE ENCOUNTER
Routing refill request to provider for review/approval because:  Abby given x1 and patient did not follow up, please advise  Wendi Srinivasan, RN,BSN  [facility]

## 2020-01-03 NOTE — TELEPHONE ENCOUNTER
"Routing refill request to provider for review/approval because:  Labs not current:  Creatinine  Last done 6/7/18  Was advised in last office visit to Return in about 2 weeks (around 12/18/2019). No f/up appointment scheduled    Elisabet Rocha RN on 1/3/2020 at 10:15 AM                  Requested Prescriptions   Pending Prescriptions Disp Refills     amLODIPine (NORVASC) 10 MG tablet 30 tablet 0     Sig: Take 1 tablet (10 mg) by mouth At Bedtime       Calcium Channel Blockers Protocol  Failed - 1/2/2020 11:12 AM        Failed - Blood pressure under 140/90 in past 12 months     BP Readings from Last 3 Encounters:   12/04/19 (!) 180/90   09/04/19 (!) 148/98   05/09/19 170/90                 Failed - Normal serum creatinine on file in past 12 months     Recent Labs   Lab Test 06/07/18  0757   CR 0.62             Passed - Recent (12 mo) or future (30 days) visit within the authorizing provider's specialty     Patient has had an office visit with the authorizing provider or a provider within the authorizing providers department within the previous 12 mos or has a future within next 30 days. See \"Patient Info\" tab in inbasket, or \"Choose Columns\" in Meds & Orders section of the refill encounter.              Passed - Medication is active on med list        Passed - Patient is age 18 or older        Passed - No active pregnancy on record        Passed - No positive pregnancy test in past 12 months          "

## 2020-02-10 DIAGNOSIS — N92.1 BREAKTHROUGH BLEEDING ON DEPO-PROVERA: ICD-10-CM

## 2020-02-10 RX ORDER — ESTRADIOL 0.5 MG/1
0.5 TABLET ORAL DAILY
Qty: 30 TABLET | Refills: 0 | Status: SHIPPED | OUTPATIENT
Start: 2020-02-10 | End: 2021-03-17

## 2020-02-10 NOTE — TELEPHONE ENCOUNTER
estradiol (ESTRACE) 1 MG tablet 1 mg, DAILY 1 ordered  EditCancel Reorder       Summary: Take 1 tablet (1 mg) by mouth daily Schedule blood pressure follow up with PCP, prior to further refills, Disp-30 tablet, R-1, E-Prescribe   Dose, Route, Frequency: 1 mg, Oral, DAILY  Start: 12/11/2019  Ord/Sold: 12/11/2019 (O)  Report  Adh:   Taking:   Long-term:   [facility]: [id number] [place] [address] AT [facility] [address]  Med Dose History       Patient Sig: Take 1 tablet (1 mg) by mouth daily Schedule blood pressure follow up with PCP, prior to further refills       Ordered on: 12/11/2019       Authorized by: TYSON IBARRA       Dispense: 30 tablet       Admin Instructions: Schedule blood pressure follow up with PCP, prior to further refills         04/30/2019 last visit  No future appt found  Sylvia Lewis M.A.

## 2020-02-25 ENCOUNTER — TELEPHONE (OUTPATIENT)
Dept: OBGYN | Facility: CLINIC | Age: 50
End: 2020-02-25

## 2020-02-25 NOTE — TELEPHONE ENCOUNTER
Requested Prescriptions   Pending Prescriptions Disp Refills     amLODIPine (NORVASC) 10 MG tablet 30 tablet 0     Sig: Take 1 tablet (10 mg) by mouth At Bedtime       There is no refill protocol information for this order        Last refill 1/3/20 # 30  Last OV 12/4/10    BP Readings from Last 3 Encounters:   12/04/19 (!) 180/90   09/04/19 (!) 148/98   05/09/19 170/90     Plan: Return in about 2 weeks (around 12/18/2019).     Dea Chávez MD  [facility]

## 2020-02-25 NOTE — TELEPHONE ENCOUNTER
Reason for Call:  Other prescription    Detailed comments: patient needs a refill for:    amLODIPine (NORVASC) 10 MG tablet    Phone Number Patient can be reached at: Cell number on file:    Telephone Information:   Mobile [phone]       Best Time: asap    Can we leave a detailed message on this number? YES    Call taken on 2/25/2020 at 10:34 AM by Cindy Conti

## 2020-02-25 NOTE — TELEPHONE ENCOUNTER
Reason for Call:  Other prescription question    Detailed comments: patient wants to know why her prescription dosage was changed on the following script:    estradiol (ESTRACE) 0.5 MG tablet    Phone Number Patient can be reached at: Cell number on file:    Telephone Information:   Mobile [phone]       Best Time: asap    Can we leave a detailed message on this number? YES    Call taken on 2/25/2020 at 10:39 AM by Cindy Conti

## 2020-02-25 NOTE — TELEPHONE ENCOUNTER
Estrace was recently decreased from 1 mg to 0.5 mg with last refill due to blood pressure concerns. Please see Medication signature per Dr. Maxwell. Last 2 recorded B/Ps were > 140/90. Patient is to be instructed to follow up with Family Practice regarding her blood pressures.     RN called and spoke to patient, relaying that she needed to follow up with FP for management of her B/P. Patient verbalized understanding and is scheduled tomorrow with FP. Once stable, patient can let provider know and can reassess and make dosage changes as applicable. Patient also wondering if at that time, he can order 3 month refill instead of 1 month for convenience.     Patient to keep us updated on B/Ps.     Patient verbalized understanding and agreed to plan.     Judith Barajas RN on 2/25/2020 at 11:02 AM

## 2020-02-25 NOTE — TELEPHONE ENCOUNTER
Called patient and advised that she is overdue for OV.  Appointment made with Johanne Chávez for tomorrow at 2:40  Patient stated that she does not want to refill med until she see's provider.  Advised that she should refill and take med today.  Patient refused and stated that she will discuss at appointment.    Wendi Srinivasan RN,BSN  [facility]

## 2020-03-02 DIAGNOSIS — I10 ESSENTIAL HYPERTENSION: ICD-10-CM

## 2020-03-02 NOTE — TELEPHONE ENCOUNTER
"Requested Prescriptions   Pending Prescriptions Disp Refills     losartan-hydrochlorothiazide (HYZAAR) 100-25 MG tablet 30 tablet 0     Sig: Take 1 tablet by mouth daily   Last Written Prescription Date:  1-3-2020  Last Fill Quantity: 30,  # refills: 0   Last office visit: 12/4/2019 with prescribing provider:  12-4-19   Future Office Visit:        Angiotensin-II Receptors Failed - 3/2/2020  1:56 PM        Failed - Last blood pressure under 140/90 in past 12 months     BP Readings from Last 3 Encounters:   12/04/19 (!) 180/90   09/04/19 (!) 148/98   05/09/19 170/90                 Failed - Normal serum creatinine on file in past 12 months     Recent Labs   Lab Test 06/07/18  0757   CR 0.62             Failed - Normal serum potassium on file in past 12 months     Recent Labs   Lab Test 06/28/18  0737   POTASSIUM 4.4                    Passed - Recent (12 mo) or future (30 days) visit within the authorizing provider's specialty     Patient has had an office visit with the authorizing provider or a provider within the authorizing providers department within the previous 12 mos or has a future within next 30 days. See \"Patient Info\" tab in inbasket, or \"Choose Columns\" in Meds & Orders section of the refill encounter.              Passed - Medication is active on med list        Passed - Patient is age 18 or older        Passed - No active pregnancy on record        Passed - No positive pregnancy test in past 12 months        Requested Prescriptions                                                                      Passed - Recent (12 mo) or future (30 days) visit within the authorizing provider's specialty     Patient has had an office visit with the authorizing provider or a provider within the authorizing providers department within the previous 12 mos or has a future within next 30 days. See \"Patient Info\" tab in inbasket, or \"Choose Columns\" in Meds & Orders section of the refill encounter.              Passed - " Medication is active on med list        Passed - Patient is age 18 or older        Passed - No active pregnancy on record        Passed - No positive pregnancy test in past 12 months        amLODIPine (NORVASC) 10 MG tablet 30 tablet 0     Sig: Take 1 tablet (10 mg) by mouth At Bedtime       There is no refill protocol information for this order   Last Written Prescription Date:  1-3-2020  Last Fill Quantity: 30,  # refills: 0   Last office visit: 12/4/2019 with prescribing provider:     Future Office Visit:

## 2020-03-02 NOTE — TELEPHONE ENCOUNTER
Routing refill request to provider for review/approval because:  Failed BP and labs  Wendi Srinivasan, RN,BSN  [facility]

## 2020-03-03 RX ORDER — AMLODIPINE BESYLATE 10 MG/1
10 TABLET ORAL AT BEDTIME
Qty: 30 TABLET | Refills: 0 | Status: SHIPPED | OUTPATIENT
Start: 2020-03-03 | End: 2020-05-28

## 2020-03-03 RX ORDER — LOSARTAN POTASSIUM AND HYDROCHLOROTHIAZIDE 25; 100 MG/1; MG/1
1 TABLET ORAL DAILY
Qty: 30 TABLET | Refills: 0 | Status: SHIPPED | OUTPATIENT
Start: 2020-03-03 | End: 2020-05-28

## 2020-04-01 ENCOUNTER — ALLIED HEALTH/NURSE VISIT (OUTPATIENT)
Dept: NURSING | Facility: CLINIC | Age: 50
End: 2020-04-01
Payer: COMMERCIAL

## 2020-04-01 DIAGNOSIS — Z30.9 CONTRACEPTIVE MANAGEMENT: Primary | ICD-10-CM

## 2020-04-01 LAB — HCG UR QL: NEGATIVE

## 2020-04-01 PROCEDURE — 96372 THER/PROPH/DIAG INJ SC/IM: CPT

## 2020-04-01 PROCEDURE — 99207 ZZC NO CHARGE NURSE ONLY: CPT

## 2020-04-01 PROCEDURE — 81025 URINE PREGNANCY TEST: CPT | Performed by: FAMILY MEDICINE

## 2020-04-01 NOTE — LETTER
April 2, 2020      Elisabet Dominguez  [address]   [place] [address]          Dear Ms.Angelica,    We are writing to inform you of your test results.  Your results are normal.       Resulted Orders   HCG Qual, Urine ([id number])   Result Value Ref Range    HCG Qual Urine Negative NEG^Negative      Comment:      This test is for screening purposes.  Results should be interpreted along with   the clinical picture.  Confirmation testing is available if warranted by   ordering [id number], HCG Quantitative Pregnancy.         If you have any questions or concerns, please call the clinic at the number listed above.       Sincerely,        Dori Tijerina MD

## 2020-04-01 NOTE — PROGRESS NOTES
Clinic Administered Medication Documentation      Depo Provera Documentation    URINE HCG: negative    Depo-Provera Standing Order inclusion/exclusion criteria reviewed.   Patient meets: inclusion criteria     BP: Data Unavailable  LAST PAP/EXAM:   Lab Results   Component Value Date    PAP NIL 03/23/2018       Prior to injection, verified patient identity using patient's name and date of birth. Medication was administered. Please see MAR and medication order for additional information.     Was entire vial of medication used? Yes  Vial/Syringe: Single dose vial  Expiration Date:  10/2020  Given in right arm    Patient instructed to remain in clinic for 15 minutes.  NEXT INJECTION DUE: 6/17/20 - 7/1/20      Gina David. MA

## 2020-04-02 DIAGNOSIS — N92.1 BREAKTHROUGH BLEEDING ON DEPO-PROVERA: ICD-10-CM

## 2020-04-02 NOTE — TELEPHONE ENCOUNTER
estradiol (ESTRACE) 0.5 MG tablet 0.5 mg, DAILY 0 ordered  EditCancel Reorder       Summary: Take 1 tablet (0.5 mg) by mouth daily, Disp-30 tablet, R-0, E-Prescribe  The dose is lower due to blood pressure concerns     Dose, Route, Frequency: 0.5 mg, Oral, DAILY  Start: 2/10/2020  Ord/Sold: 2/10/2020 (O)  Report  Adh:   Taking:   Long-term:   Pharmacy: [facility] #[id number] [address] AT [facility] [address]  Med Dose History       Patient Sig: Take 1 tablet (0.5 mg) by mouth daily       Ordered on: 2/10/2020       Authorized by: TYSON IBARRA       Dispense: 30 tablet       Note to Pharmacy: The dose is lower due to blood pressure concerns        Lf 02/10/2020

## 2020-04-02 NOTE — TELEPHONE ENCOUNTER
"Hormone Replacement Therapy Failed     Rerun Protocol  (4/2/2020 11:59 AM)       Blood pressure under 140/90 in past 12 months         BP Readings from Last 3 Encounters:   12/04/19 (!) 180/90   09/04/19 (!) 148/98   05/09/19 170/90                Recent (12 mo) or future (30 days) visit within the authorizing provider's specialty     Patient has had an office visit with the authorizing provider or a provider within the authorizing providers department within the previous 12 mos or has a future within next 30 days. See \"Patient Info\" tab in inbasket, or \"Choose Columns\" in Meds & Orders section of the refill encounter.              Medication is active on med list         Patient is 18 years of age or older         No active pregnancy on record         No positive pregnancy test on record in past 12 months      Patient last seen: 4/30/19     Per 2/25/2020 Mychart:\" Estrace was recently decreased from 1 mg to 0.5 mg with last refill due to blood pressure concerns. Please see Medication signature per Dr. Maxwell. Last 2 recorded B/Ps were > 140/90. Patient is to be instructed to follow up with Family Practice regarding her blood pressures.      RN called and spoke to patient, relaying that she needed to follow up with FP for management of her B/P. Patient verbalized understanding and is scheduled tomorrow with FP. Once stable, patient can let provider know and can reassess and make dosage changes as applicable. Patient also wondering if at that time, he can order 3 month refill instead of 1 month for convenience.      Patient to keep us updated on B/Ps.      Patient verbalized understanding and agreed to plan.      Judith Barajas RN on 2/25/2020 at 11:02 AM \"    RN routing to provider for further advisement.    Cintia Schmidt RN on 4/2/2020 at 12:15 PM    "

## 2020-04-03 DIAGNOSIS — Z53.9 ERRONEOUS ENCOUNTER--DISREGARD: Primary | ICD-10-CM

## 2020-04-03 DIAGNOSIS — N92.1 BREAKTHROUGH BLEEDING ON DEPO-PROVERA: ICD-10-CM

## 2020-04-03 RX ORDER — ESTRADIOL 0.5 MG/1
0.5 TABLET ORAL DAILY
Qty: 30 TABLET | Refills: 0 | Status: CANCELLED | OUTPATIENT
Start: 2020-04-03

## 2020-04-03 NOTE — TELEPHONE ENCOUNTER
RN took call from Dr. Maxwell.    Dr. Maxwell advises not to refill this medication until patient's blood pressures are under control.    Unable to reach patient via phone. RN left a message and instructed patient to call the clinic at [phone] and ask for Women's Health.      Cintia Schmidt RN on 4/3/2020 at 10:42 AM

## 2020-04-03 NOTE — TELEPHONE ENCOUNTER
Rn took call from patient and relayed providers advisement below.    Patient verbalized understanding and agreed to plan.     Patient states she recently switched insurance and was having difficulty filling her blood pressure medication but is now calling in to have it filled now she has new insurance.    RN recommended patient call back once she has started on her BP medication and has had her BP checked and then we can route a refill request to Dr. Connors.    Patient verbalized understanding and agreed to plan.     Patient was given the opportunity to ask additional questions and have them answered. Patient had no further questions.     Cintia Schmidt RN on 4/3/2020 at 10:53 AM

## 2020-04-03 NOTE — TELEPHONE ENCOUNTER
"Her blood pressure has been elevated and she had an appointment in Feb with FP, which she canceled.    Her blood pressure needs to be controlled before additional estrogen is used, due to the increased risks for strokes and other complications possible.   Once the blood pressure is controlled by documentation, then the estrogen may be given with the hypertension as a \"relative risk\" rather than an absolute risk.   Alok Maxwell MD    "

## 2020-05-27 DIAGNOSIS — I10 ESSENTIAL HYPERTENSION: ICD-10-CM

## 2020-05-28 RX ORDER — AMLODIPINE BESYLATE 10 MG/1
10 TABLET ORAL AT BEDTIME
Qty: 30 TABLET | Refills: 0 | Status: SHIPPED | OUTPATIENT
Start: 2020-05-28 | End: 2020-06-24

## 2020-05-28 RX ORDER — LOSARTAN POTASSIUM AND HYDROCHLOROTHIAZIDE 25; 100 MG/1; MG/1
1 TABLET ORAL DAILY
Qty: 30 TABLET | Refills: 0 | Status: SHIPPED | OUTPATIENT
Start: 2020-05-28 | End: 2020-06-24

## 2020-06-18 ENCOUNTER — ALLIED HEALTH/NURSE VISIT (OUTPATIENT)
Dept: NURSING | Facility: CLINIC | Age: 50
End: 2020-06-18
Payer: COMMERCIAL

## 2020-06-18 DIAGNOSIS — Z30.9 CONTRACEPTIVE MANAGEMENT: Primary | ICD-10-CM

## 2020-06-18 PROCEDURE — 96372 THER/PROPH/DIAG INJ SC/IM: CPT

## 2020-06-18 RX ADMIN — MEDROXYPROGESTERONE ACETATE 150 MG: 150 INJECTION, SUSPENSION INTRAMUSCULAR at 09:21

## 2020-06-24 DIAGNOSIS — I10 ESSENTIAL HYPERTENSION: ICD-10-CM

## 2020-06-24 RX ORDER — LOSARTAN POTASSIUM AND HYDROCHLOROTHIAZIDE 25; 100 MG/1; MG/1
1 TABLET ORAL DAILY
Qty: 30 TABLET | Refills: 0 | Status: SHIPPED | OUTPATIENT
Start: 2020-06-24 | End: 2020-06-24

## 2020-06-24 RX ORDER — LOSARTAN POTASSIUM AND HYDROCHLOROTHIAZIDE 25; 100 MG/1; MG/1
1 TABLET ORAL DAILY
Qty: 90 TABLET | Refills: 1 | Status: SHIPPED | OUTPATIENT
Start: 2020-06-24 | End: 2021-03-17

## 2020-06-24 RX ORDER — AMLODIPINE BESYLATE 10 MG/1
10 TABLET ORAL AT BEDTIME
Qty: 90 TABLET | Refills: 1 | Status: SHIPPED | OUTPATIENT
Start: 2020-06-24 | End: 2021-03-17

## 2020-06-24 NOTE — TELEPHONE ENCOUNTER
"Last Written Prescription Date:  5-  Last Fill Quantity: 30,  # refills: 0   Last office visit: 12/4/2019 with prescribing provider:  12-4-19   Future Office Visit:  Requested Prescriptions   Pending Prescriptions Disp Refills     amLODIPine (NORVASC) 10 MG tablet 30 tablet 0     Sig: Take 1 tablet (10 mg) by mouth At Bedtime       Calcium Channel Blockers Protocol  Failed - 6/24/2020  1:12 PM        Failed - Blood pressure under 140/90 in past 12 months     BP Readings from Last 3 Encounters:   12/04/19 (!) 180/90   09/04/19 (!) 148/98   05/09/19 170/90                 Failed - Normal serum creatinine on file in past 12 months     Recent Labs   Lab Test 06/07/18  0757   CR 0.62       Ok to refill medication if creatinine is low          Passed - Recent (12 mo) or future (30 days) visit within the authorizing provider's specialty     Patient has had an office visit with the authorizing provider or a provider within the authorizing providers department within the previous 12 mos or has a future within next 30 days. See \"Patient Info\" tab in inbasket, or \"Choose Columns\" in Meds & Orders section of the refill encounter.              Passed - Medication is active on med list        Passed - Patient is age 18 or older        Passed - No active pregnancy on record        Passed - No positive pregnancy test in past 12 months           "

## 2020-06-24 NOTE — TELEPHONE ENCOUNTER
"Last Written Prescription Date:  5-  Last Fill Quantity: 30,  # refills: 0   Last office visit: 12/4/2019 with prescribing provider:  12-4-19   Future Office Visit:  Requested Prescriptions   Pending Prescriptions Disp Refills     losartan-hydrochlorothiazide (HYZAAR) 100-25 MG tablet 30 tablet 0     Sig: Take 1 tablet by mouth daily       Angiotensin-II Receptors Failed - 6/24/2020  1:13 PM        Failed - Last blood pressure under 140/90 in past 12 months     BP Readings from Last 3 Encounters:   12/04/19 (!) 180/90   09/04/19 (!) 148/98   05/09/19 170/90                 Failed - Normal serum creatinine on file in past 12 months     Recent Labs   Lab Test 06/07/18  0757   CR 0.62       Ok to refill medication if creatinine is low          Failed - Normal serum potassium on file in past 12 months     Recent Labs   Lab Test 06/28/18  0737   POTASSIUM 4.4                    Passed - Recent (12 mo) or future (30 days) visit within the authorizing provider's specialty     Patient has had an office visit with the authorizing provider or a provider within the authorizing providers department within the previous 12 mos or has a future within next 30 days. See \"Patient Info\" tab in inbasket, or \"Choose Columns\" in Meds & Orders section of the refill encounter.              Passed - Medication is active on med list        Passed - Patient is age 18 or older        Passed - No active pregnancy on record        Passed - No positive pregnancy test in past 12 months       Diuretics (Including Combos) Protocol Failed - 6/24/2020  1:13 PM        Failed - Blood pressure under 140/90 in past 12 months     BP Readings from Last 3 Encounters:   12/04/19 (!) 180/90   09/04/19 (!) 148/98   05/09/19 170/90                 Failed - Normal serum creatinine on file in past 12 months     Recent Labs   Lab Test 06/07/18  0757   CR 0.62              Failed - Normal serum potassium on file in past 12 months     Recent Labs   Lab Test " "06/28/18  0737   POTASSIUM 4.4                    Failed - Normal serum sodium on file in past 12 months     Recent Labs   Lab Test 06/07/18  0757                 Passed - Recent (12 mo) or future (30 days) visit within the authorizing provider's specialty     Patient has had an office visit with the authorizing provider or a provider within the authorizing providers department within the previous 12 mos or has a future within next 30 days. See \"Patient Info\" tab in inbasket, or \"Choose Columns\" in Meds & Orders section of the refill encounter.              Passed - Medication is active on med list        Passed - Patient is age 18 or older        Passed - No active pregancy on record        Passed - No positive pregnancy test in past 12 months           "

## 2020-09-04 ENCOUNTER — ALLIED HEALTH/NURSE VISIT (OUTPATIENT)
Dept: NURSING | Facility: CLINIC | Age: 50
End: 2020-09-04
Payer: COMMERCIAL

## 2020-09-04 DIAGNOSIS — Z30.9 CONTRACEPTIVE MANAGEMENT: Primary | ICD-10-CM

## 2020-09-04 PROCEDURE — 96372 THER/PROPH/DIAG INJ SC/IM: CPT

## 2020-09-04 RX ADMIN — MEDROXYPROGESTERONE ACETATE 150 MG: 150 INJECTION, SUSPENSION INTRAMUSCULAR at 10:54

## 2020-09-04 NOTE — PROGRESS NOTES
Clinic Administered Medication Documentation      Depo Provera Documentation    URINE HCG: not indicated    Depo-Provera Standing Order inclusion/exclusion criteria reviewed.   Patient meets: inclusion criteria     BP: Data Unavailable  LAST PAP/EXAM:   Lab Results   Component Value Date    PAP NIL 03/23/2018       Prior to injection, verified patient identity using patient's name and date of birth. Medication was administered. Please see MAR and medication order for additional information.     Was entire vial of medication used? Yes  Vial/Syringe: Single dose vial  Expiration Date:  10/2021  Given in right deltoid.    Patient instructed to remain in clinic for 15 minutes and report any adverse reaction to staff immediately .  NEXT INJECTION DUE: 11/20/20 - 12/4/20  Ellie HERNÁNDEZ CMA ([facility])

## 2020-11-20 ENCOUNTER — ALLIED HEALTH/NURSE VISIT (OUTPATIENT)
Dept: NURSING | Facility: CLINIC | Age: 50
End: 2020-11-20
Payer: COMMERCIAL

## 2020-11-20 DIAGNOSIS — Z23 NEED FOR PROPHYLACTIC VACCINATION AND INOCULATION AGAINST INFLUENZA: Primary | ICD-10-CM

## 2020-11-20 PROCEDURE — 90686 IIV4 VACC NO PRSV 0.5 ML IM: CPT

## 2020-11-20 PROCEDURE — 90471 IMMUNIZATION ADMIN: CPT

## 2020-11-20 PROCEDURE — 99207 PR NO CHARGE NURSE ONLY: CPT

## 2020-11-20 PROCEDURE — 96372 THER/PROPH/DIAG INJ SC/IM: CPT

## 2020-11-20 RX ADMIN — MEDROXYPROGESTERONE ACETATE 150 MG: 150 INJECTION, SUSPENSION INTRAMUSCULAR at 12:11

## 2020-11-20 NOTE — NURSING NOTE
BP: Data Unavailable    LAST PAP/EXAM:   Lab Results   Component Value Date    PAP NIL 03/23/2018     URINE HCG:not indicated    The following medication was given:     MEDICATION: Depo Provera 150mg  ROUTE: IM  SITE: Deltoid - Right  : mylan   LOT #: [id number]  EXP:02/2022  NEXT INJECTION DUE: 2/5/21 - 2/19/21   Provider: omid hook CMA ([facility])

## 2021-02-09 ENCOUNTER — ALLIED HEALTH/NURSE VISIT (OUTPATIENT)
Dept: NURSING | Facility: CLINIC | Age: 51
End: 2021-02-09

## 2021-02-09 DIAGNOSIS — Z30.9 CONTRACEPTIVE MANAGEMENT: Primary | ICD-10-CM

## 2021-02-09 PROCEDURE — 96372 THER/PROPH/DIAG INJ SC/IM: CPT

## 2021-03-17 ENCOUNTER — OFFICE VISIT (OUTPATIENT)
Dept: FAMILY MEDICINE | Facility: CLINIC | Age: 51
End: 2021-03-17
Payer: COMMERCIAL

## 2021-03-17 VITALS
DIASTOLIC BLOOD PRESSURE: 97 MMHG | TEMPERATURE: 98.3 F | HEART RATE: 84 BPM | WEIGHT: 200 LBS | OXYGEN SATURATION: 97 % | SYSTOLIC BLOOD PRESSURE: 173 MMHG | BODY MASS INDEX: 34.15 KG/M2 | HEIGHT: 64 IN

## 2021-03-17 DIAGNOSIS — Z12.11 SCREEN FOR COLON CANCER: ICD-10-CM

## 2021-03-17 DIAGNOSIS — I10 ESSENTIAL HYPERTENSION: Primary | ICD-10-CM

## 2021-03-17 DIAGNOSIS — E78.5 HYPERLIPIDEMIA LDL GOAL <100: Primary | ICD-10-CM

## 2021-03-17 DIAGNOSIS — E78.5 HYPERLIPIDEMIA LDL GOAL <100: ICD-10-CM

## 2021-03-17 DIAGNOSIS — Z11.59 NEED FOR HEPATITIS C SCREENING TEST: ICD-10-CM

## 2021-03-17 DIAGNOSIS — Z12.31 VISIT FOR SCREENING MAMMOGRAM: ICD-10-CM

## 2021-03-17 LAB
ALBUMIN SERPL-MCNC: 3.5 G/DL (ref 3.4–5)
ALP SERPL-CCNC: 89 U/L (ref 40–150)
ALT SERPL W P-5'-P-CCNC: 29 U/L (ref 0–50)
ANION GAP SERPL CALCULATED.3IONS-SCNC: 4 MMOL/L (ref 3–14)
AST SERPL W P-5'-P-CCNC: 13 U/L (ref 0–45)
BILIRUB SERPL-MCNC: 0.5 MG/DL (ref 0.2–1.3)
BUN SERPL-MCNC: 13 MG/DL (ref 7–30)
CALCIUM SERPL-MCNC: 9 MG/DL (ref 8.5–10.1)
CHLORIDE SERPL-SCNC: 107 MMOL/L (ref 94–109)
CHOLEST SERPL-MCNC: 217 MG/DL
CO2 SERPL-SCNC: 27 MMOL/L (ref 20–32)
CREAT SERPL-MCNC: 0.84 MG/DL (ref 0.52–1.04)
GFR SERPL CREATININE-BSD FRML MDRD: 81 ML/MIN/{1.73_M2}
GLUCOSE SERPL-MCNC: 95 MG/DL (ref 70–99)
HCV AB SERPL QL IA: NONREACTIVE
HDLC SERPL-MCNC: 56 MG/DL
LDLC SERPL CALC-MCNC: 109 MG/DL
NONHDLC SERPL-MCNC: 161 MG/DL
POTASSIUM SERPL-SCNC: 4 MMOL/L (ref 3.4–5.3)
PROT SERPL-MCNC: 7.6 G/DL (ref 6.8–8.8)
SODIUM SERPL-SCNC: 138 MMOL/L (ref 133–144)
TRIGL SERPL-MCNC: 260 MG/DL

## 2021-03-17 PROCEDURE — 99213 OFFICE O/P EST LOW 20 MIN: CPT | Performed by: NURSE PRACTITIONER

## 2021-03-17 PROCEDURE — 80061 LIPID PANEL: CPT | Performed by: NURSE PRACTITIONER

## 2021-03-17 PROCEDURE — 36415 COLL VENOUS BLD VENIPUNCTURE: CPT | Performed by: NURSE PRACTITIONER

## 2021-03-17 PROCEDURE — 80053 COMPREHEN METABOLIC PANEL: CPT | Performed by: NURSE PRACTITIONER

## 2021-03-17 PROCEDURE — 86803 HEPATITIS C AB TEST: CPT | Performed by: NURSE PRACTITIONER

## 2021-03-17 RX ORDER — LOSARTAN POTASSIUM AND HYDROCHLOROTHIAZIDE 25; 100 MG/1; MG/1
1 TABLET ORAL DAILY
Qty: 90 TABLET | Refills: 3 | Status: SHIPPED | OUTPATIENT
Start: 2021-03-17 | End: 2021-10-19

## 2021-03-17 RX ORDER — AMLODIPINE BESYLATE 10 MG/1
10 TABLET ORAL AT BEDTIME
Qty: 90 TABLET | Refills: 3 | Status: SHIPPED | OUTPATIENT
Start: 2021-03-17 | End: 2021-10-19

## 2021-03-17 RX ORDER — ATORVASTATIN CALCIUM 20 MG/1
20 TABLET, FILM COATED ORAL DAILY
Qty: 90 TABLET | Refills: 3 | Status: SHIPPED | OUTPATIENT
Start: 2021-03-17 | End: 2021-10-19

## 2021-03-17 ASSESSMENT — MIFFLIN-ST. JEOR: SCORE: 1504.25

## 2021-03-17 NOTE — PROGRESS NOTES
"    Assessment & Plan     Essential hypertension  Has been out of medications for the past 6 weeks due to insurance issues. Restart amlodipine now and then in one week restart hyzaar.   - losartan-hydrochlorothiazide (HYZAAR) 100-25 MG tablet; Take 1 tablet by mouth daily  - amLODIPine (NORVASC) 10 MG tablet; Take 1 tablet (10 mg) by mouth At Bedtime  - Comprehensive metabolic panel    Hyperlipidemia LDL goal <100  - Lipid panel reflex to direct LDL Fasting    Screen for colon cancer  - SHANICE([employer])    Need for hepatitis C screening test  - Hepatitis C Screen Reflex to HCV RNA Quant and Genotype    Visit for screening mammogram  - MA SCREENING DIGITAL BILAT - Future  (s+30); Future       Tobacco Cessation:   reports that she has been smoking cigarettes. She has a 0.40 pack-year smoking history. She has never used smokeless tobacco.  Tobacco Cessation Action Plan: Information offered: Patient not interested at this time    BMI:   Estimated body mass index is 34.87 kg/m  as calculated from the following:    Height as of this encounter: 1.613 m (5' 3.5\").    Weight as of this encounter: 90.7 kg (200 lb).   Weight management plan: Discussed healthy diet and exercise guidelines    Return in about 4 weeks (around 4/14/2021) for ancillary blood pressure check.    AGA Kinney CNP  [facility] HUONG Bhandari is a 51 year old who presents for the following health issues     HPI     Hypertension Follow-up      Do you check your blood pressure regularly outside of the clinic? No     Are you following a low salt diet? Yes    Are your blood pressures ever more than 140 on the top number (systolic) OR more   than 90 on the bottom number (diastolic), for example 140/90?       How many servings of fruits and vegetables do you eat daily?  0-1    On average, how many sweetened beverages do you drink each day (Examples: soda, juice, sweet tea, etc.  Do NOT count diet or artificially " "sweetened beverages)?   0    How many days per week do you exercise enough to make your heart beat faster?     How many minutes a day do you exercise enough to make your heart beat faster?   How many days per week do you miss taking your medication? No insurance  What makes it hard for you to take your medications?  cost of medication Was out of medications for about 6 weeks. Now has insurance.  States that her BPs were \"good\" when she was on the amlodipine and Hyzaar without side effects.     Review of Systems   Constitutional, HEENT, cardiovascular, pulmonary, gi and gu systems are negative, except as otherwise noted.      Objective    BP (!) 173/97   Pulse 84   Temp 98.3  F (36.8  C) (Oral)   Ht 1.613 m (5' 3.5\")   Wt 90.7 kg (200 lb)   SpO2 97%   Breastfeeding No   BMI 34.87 kg/m    Body mass index is 34.87 kg/m .  Physical Exam   GENERAL: healthy, alert and no distress  EYES: Eyes grossly normal to inspection, PERRL and conjunctivae and sclerae normal  RESP: lungs clear to auscultation - no rales, rhonchi or wheezes  CV: regular rate and rhythm, normal S1 S2, no S3 or S4, no murmur, click or rub, no peripheral edema and peripheral pulses strong  PSYCH: mentation appears normal, affect normal/bright            "

## 2021-03-18 ENCOUNTER — TELEPHONE (OUTPATIENT)
Dept: FAMILY MEDICINE | Facility: CLINIC | Age: 51
End: 2021-03-18

## 2021-03-18 NOTE — TELEPHONE ENCOUNTER
"Left message on voicemail advising patient to return call at [phone] to receive test results.    \"Dear Elisabet,      Your kidney, liver, electrolytes, glucose, and hepatitis C screen labs are all normal. Your cholesterol numbers are elevated. Your current calculated 10 year ASCVD (heart attack) risk is intermediate at 9.3% so medication IS needed at this time to lower your risk. I have sent a prescription for atorvastatin to your pharmacy on file.  Please call and schedule a lab only appointment for 3 months to recheck your numbers. The following lifestyle guidelines are also recommend to help improve your numbers and further reduce your risk.      Lifestyle recommendations: Maintain blood pressure control and a healthy weight. Avoid tobacco.  Limit caffeine, sugars, and alcohol. Reduce saturated fat in the diet and increase whole plant-based foods.  Get at least 150 minutes/week moderate intensity aerobic physical activity.  Also, do muscle strengthening activities on 2 or more days/week.     atorvastatin (LIPITOR) 20 MG tablet Take 1 tablet (20 mg) by mouth daily  Pharmacy  [id number] [address] [facility]        Regards,  Brenda Dean, AGA CNP\"      Yanira COLEMAN, RN  [facility]    "

## 2021-03-18 NOTE — TELEPHONE ENCOUNTER
Pt was given provider's message as written. Agrees with plan. States she will call close to the end of the 90 day script to schedule a lab only appt.    Ellie Duran RN  [facility]

## 2021-04-21 ENCOUNTER — ALLIED HEALTH/NURSE VISIT (OUTPATIENT)
Dept: NURSING | Facility: CLINIC | Age: 51
End: 2021-04-21
Payer: COMMERCIAL

## 2021-04-21 VITALS — HEART RATE: 109 BPM | OXYGEN SATURATION: 98 % | SYSTOLIC BLOOD PRESSURE: 132 MMHG | DIASTOLIC BLOOD PRESSURE: 74 MMHG

## 2021-04-21 DIAGNOSIS — Z01.30 BP CHECK: Primary | ICD-10-CM

## 2021-05-03 ENCOUNTER — ALLIED HEALTH/NURSE VISIT (OUTPATIENT)
Dept: NURSING | Facility: CLINIC | Age: 51
End: 2021-05-03
Payer: COMMERCIAL

## 2021-05-03 DIAGNOSIS — Z30.9 CONTRACEPTIVE MANAGEMENT: Primary | ICD-10-CM

## 2021-05-03 PROCEDURE — 96372 THER/PROPH/DIAG INJ SC/IM: CPT

## 2021-05-03 PROCEDURE — 99207 PR NO CHARGE NURSE ONLY: CPT

## 2021-05-03 NOTE — PROGRESS NOTES
BP: Data Unavailable    LAST PAP/EXAM:   Lab Results   Component Value Date    PAP NIL 03/23/2018     URINE HCG:not indicated    The following medication was given:     MEDICATION: Depo Provera 150mg  ROUTE: IM  SITE: Deltoid - Right  : [employer]   LOT #: [id number]  EXP:09/2022  NEXT INJECTION DUE: 7/19/21 - 8/2/21

## 2021-07-29 ENCOUNTER — ALLIED HEALTH/NURSE VISIT (OUTPATIENT)
Dept: NURSING | Facility: CLINIC | Age: 51
End: 2021-07-29
Payer: COMMERCIAL

## 2021-07-29 DIAGNOSIS — Z30.9 CONTRACEPTIVE MANAGEMENT: Primary | ICD-10-CM

## 2021-07-29 PROCEDURE — 99207 PR NO CHARGE NURSE ONLY: CPT

## 2021-07-29 PROCEDURE — 96372 THER/PROPH/DIAG INJ SC/IM: CPT | Performed by: FAMILY MEDICINE

## 2021-07-29 RX ADMIN — MEDROXYPROGESTERONE ACETATE 150 MG: 150 INJECTION, SUSPENSION INTRAMUSCULAR at 14:48

## 2021-07-29 NOTE — PROGRESS NOTES
Clinic Administered Medication Documentation    Administrations This Visit     medroxyPROGESTERone (DEPO-PROVERA) injection 150 mg     Admin Date  07/29/2021 Action  Given Dose  150 mg Route  Intramuscular Site  Left Deltoid Administered By  Ellie Cardoso    Ordering Provider: Chloé Wilder MD    Patient Supplied?: No    Comments: on schedule                  Depo Provera Documentation    URINE HCG: not indicated    Depo-Provera Standing Order inclusion/exclusion criteria reviewed.   Patient meets: inclusion criteria     BP: Data Unavailable  LAST PAP/EXAM:   Lab Results   Component Value Date    PAP NIL 03/23/2018       Prior to injection, verified patient identity using patient's name and date of birth. Medication was administered. Please see MAR and medication order for additional information.     Was entire vial of medication used? Yes  Vial/Syringe: Single dose vial  Expiration Date:  09/2022  Given in left deltoid    Patient instructed to remain in clinic for 15 minutes and report any adverse reaction to staff immediately .  NEXT INJECTION DUE: 10/14/21 - 10/28/21  Ellie HERNÁNDEZ CMA ([facility])

## 2021-08-26 ENCOUNTER — OFFICE VISIT (OUTPATIENT)
Dept: DERMATOLOGY | Facility: CLINIC | Age: 51
End: 2021-08-26
Payer: COMMERCIAL

## 2021-08-26 VITALS — SYSTOLIC BLOOD PRESSURE: 160 MMHG | DIASTOLIC BLOOD PRESSURE: 96 MMHG | HEART RATE: 100 BPM | OXYGEN SATURATION: 97 %

## 2021-08-26 DIAGNOSIS — L82.1 SEBORRHEIC KERATOSIS: Primary | ICD-10-CM

## 2021-08-26 DIAGNOSIS — L72.0 MILIA: ICD-10-CM

## 2021-08-26 PROCEDURE — 99213 OFFICE O/P EST LOW 20 MIN: CPT | Performed by: PHYSICIAN ASSISTANT

## 2021-08-26 NOTE — NURSING NOTE
Chief Complaint   Patient presents with     Derm Problem     white spots on face and arms       Vitals:    08/26/21 1410   BP: (!) 160/96   BP Location: Right arm   Patient Position: Sitting   Cuff Size: Adult Large   Pulse: 100   SpO2: 97%     Wt Readings from Last 1 Encounters:   03/17/21 90.7 kg (200 lb)       Alissa Miller LPN .................8/26/2021

## 2021-08-26 NOTE — LETTER
8/26/2021         RE: Elisabet Dominguez  [address]  [place] [address]        Dear Colleague,    Thank you for referring your patient, Elisabet Dominguez, to the [facility]. Please see a copy of my visit note below.    Elisabet Dominguez is an extremely pleasant 51 year old year old female patient here today for spot on forehead. Present of a few years, not painful or bleeding. She notes it has become more noticeable with sun exposure.  Patient has no other skin complaints today.  Remainder of the HPI, Meds, PMH, Allergies, FH, and SH was reviewed in chart.   Past Medical History:   Diagnosis Date     Human papillomavirus in conditions classified elsewhere and of unspecified site 1995    abnoraml pap-had Cryo     Hypertension goal BP (blood pressure) < 140/90      Obesity        Past Surgical History:   Procedure Laterality Date     CRYOTHERAPY, CERVICAL  1995        Family History   Problem Relation Age of Onset     C.A.D. Mother 63     Arthritis Mother      Hypertension Father      Cancer Father         bladder     C.A.D. Maternal Grandmother      C.A.D. Maternal Aunt      C.A.D. Maternal Uncle      Breast Cancer No family hx of      Cancer - colorectal No family hx of      Diabetes No family hx of        Social History     Socioeconomic History     Marital status:      Spouse name: Not on file     Number of children: 0     Years of education: 16     Highest education level: Not on file   Occupational History     Occupation: accounting     Employer: OTHER   Tobacco Use     Smoking status: Current Every Day Smoker     Packs/day: 0.02     Years: 20.00     Pack years: 0.40     Types: Cigarettes     Smokeless tobacco: Never Used     Tobacco comment: 2 per day   Substance and Sexual Activity     Alcohol use: Yes     Alcohol/week: 2.0 standard drinks     Types: 2 Standard drinks or equivalent per week     Drug use: No     Sexual activity: Yes     Partners: Male     Birth  control/protection: Injection   Other Topics Concern     Parent/sibling w/ CABG, MI or angioplasty before 65F 55M? Not Asked      Service No     Blood Transfusions No     Caffeine Concern No     Occupational Exposure No     Hobby Hazards No     Sleep Concern No     Stress Concern No     Weight Concern No     Special Diet No     Back Care No     Exercise No     Bike Helmet No     Seat Belt Yes     Self-Exams Yes   Social History Narrative     Not on file     Social Determinants of Health     Financial Resource Strain:      Difficulty of Paying Living Expenses:    Food Insecurity:      Worried About Running Out of Food in the Last Year:      Ran Out of Food in the Last Year:    Transportation Needs:      Lack of Transportation (Medical):      Lack of Transportation (Non-Medical):    Physical Activity:      Days of Exercise per Week:      Minutes of Exercise per Session:    Stress:      Feeling of Stress :    Social Connections:      Frequency of Communication with Friends and Family:      Frequency of Social Gatherings with Friends and Family:      Attends Gnosticist Services:      Active Member of Clubs or Organizations:      Attends Club or Organization Meetings:      Marital Status:    Intimate Partner Violence:      Fear of Current or Ex-Partner:      Emotionally Abused:      Physically Abused:      Sexually Abused:        Outpatient Encounter Medications as of 8/26/2021   Medication Sig Dispense Refill     amLODIPine (NORVASC) 10 MG tablet Take 1 tablet (10 mg) by mouth At Bedtime 90 tablet 3     losartan-hydrochlorothiazide (HYZAAR) 100-25 MG tablet Take 1 tablet by mouth daily 90 tablet 3     medroxyPROGESTERone (DEPO-PROVERA) 150 MG/ML injection Inject 1 mL (150 mg) into the muscle every 3 months 3 mL 3     atorvastatin (LIPITOR) 20 MG tablet Take 1 tablet (20 mg) by mouth daily (Patient not taking: Reported on 8/26/2021) 90 tablet 3     Facility-Administered Encounter Medications as of 8/26/2021    Medication Dose Route Frequency Provider Last Rate Last Admin     medroxyPROGESTERone (DEPO-PROVERA) injection 150 mg  150 mg Intramuscular Q90 Days Chloé Wilder MD   150 mg at 07/29/21 1448             O:   NAD, WDWN, Alert & Oriented, Mood & Affect wnl, Vitals stable   Here today alone   BP (!) 160/96 (BP Location: Right arm, Patient Position: Sitting, Cuff Size: Adult Large)   Pulse 100   SpO2 97%    General appearance normal   Vitals stable   Alert, oriented and in no acute distress     Light brown stuck on papules on forearms and forehead  Whites papule on lower eyelid       Eyes: Conjunctivae/lids:Normal     ENT: Lips: normal    MSK:Normal    Pulm: Breathing Normal    Neuro/Psych: Orientation:Alert and Orientedx3 ; Mood/Affect:normal   A/P:  1. Seborrheic keratosis, milia   BENIGN LESIONS DISCUSSED WITH PATIENT:  I discussed the specifics of tumor, prognosis, and genetics of benign lesions.  I explained that treatment of these lesions would be purely cosmetic and not medically neccessary.  I discussed with patient different removal options including excision, cautery and /or laser.      Nature and genetics of benign skin lesions dicussed with patient.  Signs and Symptoms of skin cancer discussed with patient.  ABCDEs of melanoma reviewed with patient.  Patient encouraged to perform monthly skin exams.  UV precautions reviewed with patient.  Risks of non-melanoma skin cancer discussed with patient   Return to clinic as needed.       Again, thank you for allowing me to participate in the care of your patient.        Sincerely,        Sneha Recinos PA-C

## 2021-08-27 NOTE — PROGRESS NOTES
Elisabet Dominguez is an extremely pleasant 51 year old year old female patient here today for spot on forehead. Present of a few years, not painful or bleeding. She notes it has become more noticeable with sun exposure.  Patient has no other skin complaints today.  Remainder of the HPI, Meds, PMH, Allergies, FH, and SH was reviewed in chart.   Past Medical History:   Diagnosis Date     Human papillomavirus in conditions classified elsewhere and of unspecified site 1995    abnoraml pap-had Cryo     Hypertension goal BP (blood pressure) < 140/90      Obesity        Past Surgical History:   Procedure Laterality Date     CRYOTHERAPY, CERVICAL  1995        Family History   Problem Relation Age of Onset     C.A.D. Mother 63     Arthritis Mother      Hypertension Father      Cancer Father         bladder     C.A.D. Maternal Grandmother      C.A.D. Maternal Aunt      C.A.D. Maternal Uncle      Breast Cancer No family hx of      Cancer - colorectal No family hx of      Diabetes No family hx of        Social History     Socioeconomic History     Marital status:      Spouse name: Not on file     Number of children: 0     Years of education: 16     Highest education level: Not on file   Occupational History     Occupation: accounting     Employer: OTHER   Tobacco Use     Smoking status: Current Every Day Smoker     Packs/day: 0.02     Years: 20.00     Pack years: 0.40     Types: Cigarettes     Smokeless tobacco: Never Used     Tobacco comment: 2 per day   Substance and Sexual Activity     Alcohol use: Yes     Alcohol/week: 2.0 standard drinks     Types: 2 Standard drinks or equivalent per week     Drug use: No     Sexual activity: Yes     Partners: Male     Birth control/protection: Injection   Other Topics Concern     Parent/sibling w/ CABG, MI or angioplasty before 65F 55M? Not Asked      Service No     Blood Transfusions No     Caffeine Concern No     Occupational Exposure No     Hobby Hazards No     Sleep Concern  No     Stress Concern No     Weight Concern No     Special Diet No     Back Care No     Exercise No     Bike Helmet No     Seat Belt Yes     Self-Exams Yes   Social History Narrative     Not on file     Social Determinants of Health     Financial Resource Strain:      Difficulty of Paying Living Expenses:    Food Insecurity:      Worried About Running Out of Food in the Last Year:      Ran Out of Food in the Last Year:    Transportation Needs:      Lack of Transportation (Medical):      Lack of Transportation (Non-Medical):    Physical Activity:      Days of Exercise per Week:      Minutes of Exercise per Session:    Stress:      Feeling of Stress :    Social Connections:      Frequency of Communication with Friends and Family:      Frequency of Social Gatherings with Friends and Family:      Attends Orthodox Services:      Active Member of Clubs or Organizations:      Attends Club or Organization Meetings:      Marital Status:    Intimate Partner Violence:      Fear of Current or Ex-Partner:      Emotionally Abused:      Physically Abused:      Sexually Abused:        Outpatient Encounter Medications as of 8/26/2021   Medication Sig Dispense Refill     amLODIPine (NORVASC) 10 MG tablet Take 1 tablet (10 mg) by mouth At Bedtime 90 tablet 3     losartan-hydrochlorothiazide (HYZAAR) 100-25 MG tablet Take 1 tablet by mouth daily 90 tablet 3     medroxyPROGESTERone (DEPO-PROVERA) 150 MG/ML injection Inject 1 mL (150 mg) into the muscle every 3 months 3 mL 3     atorvastatin (LIPITOR) 20 MG tablet Take 1 tablet (20 mg) by mouth daily (Patient not taking: Reported on 8/26/2021) 90 tablet 3     Facility-Administered Encounter Medications as of 8/26/2021   Medication Dose Route Frequency Provider Last Rate Last Admin     medroxyPROGESTERone (DEPO-PROVERA) injection 150 mg  150 mg Intramuscular Q90 Days Chloé Wilder MD   150 mg at 07/29/21 1448             O:   NAD, WDWN, Alert & Oriented, Mood & Affect wnl, Vitals  stable   Here today alone   BP (!) 160/96 (BP Location: Right arm, Patient Position: Sitting, Cuff Size: Adult Large)   Pulse 100   SpO2 97%    General appearance normal   Vitals stable   Alert, oriented and in no acute distress     Light brown stuck on papules on forearms and forehead  Whites papule on lower eyelid       Eyes: Conjunctivae/lids:Normal     ENT: Lips: normal    MSK:Normal    Pulm: Breathing Normal    Neuro/Psych: Orientation:Alert and Orientedx3 ; Mood/Affect:normal   A/P:  1. Seborrheic keratosis, milia   BENIGN LESIONS DISCUSSED WITH PATIENT:  I discussed the specifics of tumor, prognosis, and genetics of benign lesions.  I explained that treatment of these lesions would be purely cosmetic and not medically neccessary.  I discussed with patient different removal options including excision, cautery and /or laser.      Nature and genetics of benign skin lesions dicussed with patient.  Signs and Symptoms of skin cancer discussed with patient.  ABCDEs of melanoma reviewed with patient.  Patient encouraged to perform monthly skin exams.  UV precautions reviewed with patient.  Risks of non-melanoma skin cancer discussed with patient   Return to clinic as needed.

## 2021-09-26 ENCOUNTER — HEALTH MAINTENANCE LETTER (OUTPATIENT)
Age: 51
End: 2021-09-26

## 2021-10-12 ENCOUNTER — TELEPHONE (OUTPATIENT)
Dept: FAMILY MEDICINE | Facility: CLINIC | Age: 51
End: 2021-10-12

## 2021-10-12 NOTE — TELEPHONE ENCOUNTER
Pt coming to [facility] for Depo injection 10/14/21.    No current order,  20.    Please place new MAR order or advise follow up to continue injection.    Thank you,    Tracie SON, CMA

## 2021-10-19 ENCOUNTER — OFFICE VISIT (OUTPATIENT)
Dept: FAMILY MEDICINE | Facility: CLINIC | Age: 51
End: 2021-10-19
Payer: COMMERCIAL

## 2021-10-19 VITALS
WEIGHT: 202.6 LBS | OXYGEN SATURATION: 97 % | SYSTOLIC BLOOD PRESSURE: 168 MMHG | DIASTOLIC BLOOD PRESSURE: 86 MMHG | BODY MASS INDEX: 35.33 KG/M2 | HEART RATE: 96 BPM | TEMPERATURE: 99.3 F | RESPIRATION RATE: 12 BRPM

## 2021-10-19 DIAGNOSIS — Z30.42 ENCOUNTER FOR SURVEILLANCE OF INJECTABLE CONTRACEPTIVE: ICD-10-CM

## 2021-10-19 DIAGNOSIS — Z72.0 TOBACCO ABUSE: ICD-10-CM

## 2021-10-19 DIAGNOSIS — Z13.6 SCREENING FOR CARDIOVASCULAR CONDITION: Primary | ICD-10-CM

## 2021-10-19 DIAGNOSIS — E78.5 HYPERLIPIDEMIA LDL GOAL <100: ICD-10-CM

## 2021-10-19 DIAGNOSIS — I10 ESSENTIAL HYPERTENSION: ICD-10-CM

## 2021-10-19 PROCEDURE — 99214 OFFICE O/P EST MOD 30 MIN: CPT | Performed by: NURSE PRACTITIONER

## 2021-10-19 RX ORDER — LOSARTAN POTASSIUM AND HYDROCHLOROTHIAZIDE 25; 100 MG/1; MG/1
1 TABLET ORAL DAILY
Qty: 90 TABLET | Refills: 3 | Status: SHIPPED | OUTPATIENT
Start: 2021-10-19 | End: 2022-08-03

## 2021-10-19 RX ORDER — AMLODIPINE BESYLATE 10 MG/1
10 TABLET ORAL AT BEDTIME
Qty: 90 TABLET | Refills: 3 | Status: SHIPPED | OUTPATIENT
Start: 2021-10-19 | End: 2021-10-26

## 2021-10-19 NOTE — NURSING NOTE
"Initial BP (!) 168/86   Pulse 96   Temp 99.3  F (37.4  C) (Tympanic)   Resp 12   Wt 91.9 kg (202 lb 9.6 oz)   SpO2 97%   BMI 35.33 kg/m   Estimated body mass index is 35.33 kg/m  as calculated from the following:    Height as of 3/17/21: 1.613 m (5' 3.5\").    Weight as of this encounter: 91.9 kg (202 lb 9.6 oz). .      "

## 2021-10-19 NOTE — PROGRESS NOTES
"    Assessment & Plan     Screening for cardiovascular condition    - Lipid panel reflex to direct LDL Fasting; Future  - Basic metabolic panel  (Ca, Cl, CO2, Creat, Gluc, K, Na, BUN); Future    Essential hypertension  Uncontrolled- patient ran out of medications.   - Refilled losartan-hydrochlorothiazide (HYZAAR) 100-25 MG tablet; Take 1 tablet by mouth daily  - Refilled amLODIPine (NORVASC) 10 MG tablet; Take 1 tablet (10 mg) by mouth At Bedtime    Hyperlipidemia LDL goal <100  Patient stopped Atorvastatin due to side effects  Recommend diet and exercise  Recheck lipid panel    Tobacco abuse  Encouraged to quit smoking    Encounter for surveillance of injectable contraceptive  Patient is a smoker and blood pressure is uncontrolled- recommend to wait on Depo- until blood pressure is controlled and encouraged to stop smoking due to increase risk of stroke     Patient to schedule follow up appointment in 1 week for blood pressure recheck, PAP and Depo and lab work.       [id number]}     BMI:   Estimated body mass index is 35.33 kg/m  as calculated from the following:    Height as of 3/17/21: 1.613 m (5' 3.5\").    Weight as of this encounter: 91.9 kg (202 lb 9.6 oz).   Weight management plan: Discussed healthy diet and exercise guidelines          AGA Rios CNP  [facility]    Cedric Bhandari is a 51 year old who presents for the following health issues     HPI     Medication Refill    Pt returns to clinic requesting refill of Contraception medication.     States doing well on this medication without issue or side effect.    Hyperlipidemia: stopped Atorvastatin due to side effects.    Needs a PAP and does not want to do today.    Current everyday smoker- trying to quit.         Hypertension Follow-up  Has been out of BP medications for one month.  Forgets to Amlodipine at night  Frequently.    Do you check your blood pressure regularly outside of the clinic? No     Are you following a " low salt diet? No    Are your blood pressures ever more than 140 on the top number (systolic) OR more   than 90 on the bottom number (diastolic), for example 140/90? Yes        Review of Systems   Constitutional, HEENT, cardiovascular, pulmonary, GI, , musculoskeletal, neuro, skin, endocrine and psych systems are negative, except as otherwise noted.      Objective    There were no vitals taken for this visit.  There is no height or weight on file to calculate BMI.  Physical Exam   GENERAL: healthy, alert and no distress  RESP: lungs clear to auscultation - no rales, rhonchi or wheezes  CV: regular rate and rhythm, normal S1 S2, no S3 or S4, no murmur, click or rub, no peripheral edema and peripheral pulses strong  MS: no gross musculoskeletal defects noted, no edema

## 2021-10-19 NOTE — PATIENT INSTRUCTIONS
Schedule PAP and fasting lab work          Thank you for choosing [facility].  You may be receiving an email and/or telephone survey request from [facility] [employer] regarding your visit today.  Please take a few minutes to respond to the survey to let us know how we are doing.      If you have questions or concerns, please contact us via [employer] or you can contact your care team at [phone] option [phone].    Our Clinic hours are:  Monday - Thursday 7am-6pm  Friday 7am-5pm    The [place] outpatient lab hours are:  Monday - Friday 7am-4:30pm    Appointments are required, call [phone]    If you have clinical questions after hours or would like to schedule an appointment,  call the clinic at [phone].

## 2021-10-26 ENCOUNTER — OFFICE VISIT (OUTPATIENT)
Dept: FAMILY MEDICINE | Facility: CLINIC | Age: 51
End: 2021-10-26
Payer: COMMERCIAL

## 2021-10-26 VITALS
HEART RATE: 117 BPM | SYSTOLIC BLOOD PRESSURE: 98 MMHG | OXYGEN SATURATION: 98 % | BODY MASS INDEX: 34.15 KG/M2 | HEIGHT: 64 IN | WEIGHT: 200 LBS | TEMPERATURE: 98.7 F | RESPIRATION RATE: 16 BRPM | DIASTOLIC BLOOD PRESSURE: 62 MMHG

## 2021-10-26 DIAGNOSIS — I10 ESSENTIAL HYPERTENSION: ICD-10-CM

## 2021-10-26 DIAGNOSIS — Z13.1 SCREENING FOR DIABETES MELLITUS: ICD-10-CM

## 2021-10-26 DIAGNOSIS — Z23 NEED FOR VACCINATION: ICD-10-CM

## 2021-10-26 DIAGNOSIS — E78.5 HYPERLIPIDEMIA LDL GOAL <100: ICD-10-CM

## 2021-10-26 DIAGNOSIS — Z01.419 SMEAR, VAGINAL, AS PART OF ROUTINE GYNECOLOGICAL EXAMINATION: ICD-10-CM

## 2021-10-26 DIAGNOSIS — N89.8 VAGINAL DISCHARGE: ICD-10-CM

## 2021-10-26 DIAGNOSIS — Z00.00 ROUTINE GENERAL MEDICAL EXAMINATION AT A HEALTH CARE FACILITY: Primary | ICD-10-CM

## 2021-10-26 DIAGNOSIS — Z12.72 SMEAR, VAGINAL, AS PART OF ROUTINE GYNECOLOGICAL EXAMINATION: ICD-10-CM

## 2021-10-26 LAB
ANION GAP SERPL CALCULATED.3IONS-SCNC: 11 MMOL/L (ref 3–14)
BUN SERPL-MCNC: 19 MG/DL (ref 7–30)
CALCIUM SERPL-MCNC: 10.2 MG/DL (ref 8.5–10.1)
CHLORIDE BLD-SCNC: 102 MMOL/L (ref 94–109)
CHOLEST SERPL-MCNC: 233 MG/DL
CLUE CELLS: PRESENT
CO2 SERPL-SCNC: 21 MMOL/L (ref 20–32)
CREAT SERPL-MCNC: 1.23 MG/DL (ref 0.52–1.04)
FASTING STATUS PATIENT QL REPORTED: YES
GFR SERPL CREATININE-BSD FRML MDRD: 51 ML/MIN/1.73M2
GLUCOSE BLD-MCNC: 108 MG/DL (ref 70–99)
HDLC SERPL-MCNC: 61 MG/DL
LDLC SERPL CALC-MCNC: 128 MG/DL
LDLC SERPL CALC-MCNC: ABNORMAL MG/DL
NONHDLC SERPL-MCNC: 172 MG/DL
POTASSIUM BLD-SCNC: 3.8 MMOL/L (ref 3.4–5.3)
SODIUM SERPL-SCNC: 134 MMOL/L (ref 133–144)
TRICHOMONAS, WET PREP: ABNORMAL
TRIGL SERPL-MCNC: 478 MG/DL
WBC'S/HIGH POWER FIELD, WET PREP: ABNORMAL
YEAST, WET PREP: ABNORMAL

## 2021-10-26 PROCEDURE — 99396 PREV VISIT EST AGE 40-64: CPT | Mod: 25 | Performed by: NURSE PRACTITIONER

## 2021-10-26 PROCEDURE — 36415 COLL VENOUS BLD VENIPUNCTURE: CPT | Performed by: NURSE PRACTITIONER

## 2021-10-26 PROCEDURE — 90471 IMMUNIZATION ADMIN: CPT | Performed by: NURSE PRACTITIONER

## 2021-10-26 PROCEDURE — 83721 ASSAY OF BLOOD LIPOPROTEIN: CPT | Mod: 59 | Performed by: NURSE PRACTITIONER

## 2021-10-26 PROCEDURE — 80048 BASIC METABOLIC PNL TOTAL CA: CPT | Performed by: NURSE PRACTITIONER

## 2021-10-26 PROCEDURE — 87624 HPV HI-RISK TYP POOLED RSLT: CPT | Performed by: NURSE PRACTITIONER

## 2021-10-26 PROCEDURE — 90714 TD VACC NO PRESV 7 YRS+ IM: CPT | Performed by: NURSE PRACTITIONER

## 2021-10-26 PROCEDURE — G0145 SCR C/V CYTO,THINLAYER,RESCR: HCPCS | Performed by: NURSE PRACTITIONER

## 2021-10-26 PROCEDURE — 80061 LIPID PANEL: CPT | Performed by: NURSE PRACTITIONER

## 2021-10-26 PROCEDURE — 87210 SMEAR WET MOUNT SALINE/INK: CPT | Performed by: NURSE PRACTITIONER

## 2021-10-26 PROCEDURE — 99214 OFFICE O/P EST MOD 30 MIN: CPT | Mod: 25 | Performed by: NURSE PRACTITIONER

## 2021-10-26 ASSESSMENT — ENCOUNTER SYMPTOMS
EYE PAIN: 0
HEMATOCHEZIA: 0
HEARTBURN: 0
PARESTHESIAS: 0
ARTHRALGIAS: 0
CONSTIPATION: 0
HEADACHES: 0
MYALGIAS: 0
CHILLS: 0
DIARRHEA: 0
HEMATURIA: 0
SORE THROAT: 0
SHORTNESS OF BREATH: 0
FEVER: 0
FREQUENCY: 0
DIZZINESS: 0
ABDOMINAL PAIN: 0
NAUSEA: 0
JOINT SWELLING: 0
NERVOUS/ANXIOUS: 0
BREAST MASS: 1
DYSURIA: 0
WEAKNESS: 0
PALPITATIONS: 0
COUGH: 0

## 2021-10-26 ASSESSMENT — MIFFLIN-ST. JEOR: SCORE: 1504.25

## 2021-10-26 NOTE — PROGRESS NOTES
SUBJECTIVE:   CC: Elisabet Dominguez is an 51 year old woman who presents for preventive health visit.     Patient has been advised of split billing requirements and indicates understanding: Yes     Healthy Habits:     Getting at least 3 servings of Calcium per day:  Yes    Bi-annual eye exam:  Yes    Dental care twice a year:  Yes    Sleep apnea or symptoms of sleep apnea:  None    Diet:  Regular (no restrictions)    Frequency of exercise:  4-5 days/week    Duration of exercise:  15-30 minutes    Taking medications regularly:  Yes    Medication side effects:  None    PHQ-2 Total Score: 0    Additional concerns today:  No    PROBLEMS TO ADD ON...    Hypertension: patient recently restarted blood pressure meds- now blood pressure is low at 98/60- denies dizziness or lightheadedness.   Hyperlipidemia- started statin several months ago and quit taking- did not like how it made her feel- will recheck lipids today.     Today's PHQ-2 Score:   PHQ-2 ( 1999 Pfizer) 10/26/2021   Q1: Little interest or pleasure in doing things 0   Q2: Feeling down, depressed or hopeless 0   PHQ-2 Score 0   Q1: Little interest or pleasure in doing things Not at all   Q2: Feeling down, depressed or hopeless Not at all   PHQ-2 Score 0       Abuse: Current or Past (Physical, Sexual or Emotional) - No  Do you feel safe in your environment? Yes    Have you ever done Advance Care Planning? (For example, a Health Directive, POLST, or a discussion with a medical provider or your loved ones about your wishes): No, advance care planning information given to patient to review.  Patient plans to discuss their wishes with loved ones or provider.      Social History     Tobacco Use     Smoking status: Current Every Day Smoker     Packs/day: 0.02     Years: 20.00     Pack years: 0.40     Types: Cigarettes     Smokeless tobacco: Never Used     Tobacco comment: 2 per day   Substance Use Topics     Alcohol use: Yes     Alcohol/week: 2.0 standard drinks     Types:  2 Standard drinks or equivalent per week         Alcohol Use 10/26/2021   Prescreen: >3 drinks/day or >7 drinks/week? No   Prescreen: >3 drinks/day or >7 drinks/week? -     Reviewed orders with patient.  Reviewed health maintenance and updated orders accordingly - Yes  BP Readings from Last 3 Encounters:   10/26/21 98/62   10/19/21 (!) 168/86   08/26/21 (!) 160/96    Wt Readings from Last 3 Encounters:   10/26/21 90.7 kg (200 lb)   10/19/21 91.9 kg (202 lb 9.6 oz)   03/17/21 90.7 kg (200 lb)                    Breast Cancer Screening:    Breast CA Risk Assessment (FHS-7) 10/26/2021   Do you have a family history of breast, colon, or ovarian cancer? No / Unknown         Mammogram Screening: Recommended annual mammography  Pertinent mammograms are reviewed under the imaging tab.    History of abnormal Pap smear:   Last 3 Pap and HPV Results:   PAP / HPV Latest Ref Rng & Units 3/23/2018 2/16/2015 9/12/2012   PAP (Historical) - NIL NIL NIL   HPV16 NEG:Negative Negative - -   HPV18 NEG:Negative Negative - -   HRHPV NEG:Negative Negative - -     PAP / HPV Latest Ref Rng & Units 3/23/2018 2/16/2015 9/12/2012   PAP (Historical) - NIL NIL NIL   HPV16 NEG:Negative Negative - -   HPV18 NEG:Negative Negative - -   HRHPV NEG:Negative Negative - -     Reviewed and updated as needed this visit by clinical staff                 Reviewed and updated as needed this visit by Provider                    Review of Systems   Constitutional: Negative for chills and fever.   HENT: Negative for congestion, ear pain, hearing loss and sore throat.    Eyes: Negative for pain and visual disturbance.   Respiratory: Negative for cough and shortness of breath.    Cardiovascular: Negative for chest pain, palpitations and peripheral edema.   Gastrointestinal: Negative for abdominal pain, constipation, diarrhea, heartburn, hematochezia and nausea.   Breasts:  Positive for breast mass. Negative for tenderness and discharge.   Genitourinary: Negative  for dysuria, frequency, genital sores, hematuria, pelvic pain, urgency, vaginal bleeding and vaginal discharge.   Musculoskeletal: Negative for arthralgias, joint swelling and myalgias.   Skin: Negative for rash.   Neurological: Negative for dizziness, weakness, headaches and paresthesias.   Psychiatric/Behavioral: Negative for mood changes. The patient is not nervous/anxious.      CONSTITUTIONAL: NEGATIVE for fever, chills, change in weight  INTEGUMENTARU/SKIN: NEGATIVE for worrisome rashes, moles or lesions  EYES: NEGATIVE for vision changes or irritation  ENT: NEGATIVE for ear, mouth and throat problems  RESP: NEGATIVE for significant cough or SOB  BREAST: history of lump in right breast- chronic   CV: NEGATIVE for chest pain, palpitations or peripheral edema  GI: NEGATIVE for nausea, abdominal pain, heartburn, or change in bowel habits  : NEGATIVE for unusual urinary or vaginal symptoms. Periods are regular.  MUSCULOSKELETAL: NEGATIVE for significant arthralgias or myalgia  NEURO: NEGATIVE for weakness, dizziness or paresthesias  PSYCHIATRIC: NEGATIVE for changes in mood or affect     OBJECTIVE:   There were no vitals taken for this visit.  Physical Exam  GENERAL: healthy, alert and no distress  EYES: Eyes grossly normal to inspection, PERRL and conjunctivae and sclerae normal  HENT: ear canals and TM's normal, nose and mouth without ulcers or lesions  NECK: no adenopathy, no asymmetry, masses, or scars and thyroid normal to palpation  RESP: lungs clear to auscultation - no rales, rhonchi or wheezes  CV: regular rate and rhythm, normal S1 S2, no S3 or S4, no murmur, click or rub, no peripheral edema and peripheral pulses strong  ABDOMEN: soft, nontender, no hepatosplenomegaly, no masses and bowel sounds normal   (female): normal female external genitalia, normal urethral meatus , vaginal mucosa pink, moist, well rugated, vaginal discharge - moderate, white and thick and normal cervix, adnexae, and uterus  "without masses.  MS: no gross musculoskeletal defects noted, no edema  SKIN: no suspicious lesions or rashes  NEURO: Normal strength and tone, mentation intact and speech normal  PSYCH: mentation appears normal, affect normal/bright    Diagnostic Test Results:  Labs reviewed in Epic    ASSESSMENT/PLAN:   (Z00.00) Routine general medical examination at a health care facility  (primary encounter diagnosis)  Comment:   Plan:     (Z01.419,  Z12.72) Smear, vaginal, as part of routine gynecological examination  Comment:   Plan: PAP screen with HPV - recommended age 30 - 65         years            (E78.5) Hyperlipidemia LDL goal <100  Comment: stopped statin > 6 months ago due to side effects- will recheck lipids today  Plan: Lipid panel reflex to direct LDL Fasting            (I10) Essential hypertension  Comment: blood pressure low-   Plan: stop amlodipine  Continue Hyzaar  Schedule RN visit to recheck blood pressure     (Z13.1) Screening for diabetes mellitus  Comment:   Plan: Basic metabolic panel  (Ca, Cl, CO2, Creat,         Gluc, K, Na, BUN)            (N89.8) Vaginal discharge  Comment: ? BV vs yeast - treatment pending results   Plan: Wet prep - Clinic Collect              Patient has been advised of split billing requirements and indicates understanding: Yes  COUNSELING:  Reviewed preventive health counseling, as reflected in patient instructions       Regular exercise       Healthy diet/nutrition    Estimated body mass index is 35.33 kg/m  as calculated from the following:    Height as of 3/17/21: 1.613 m (5' 3.5\").    Weight as of 10/19/21: 91.9 kg (202 lb 9.6 oz).    Weight management plan: Discussed healthy diet and exercise guidelines    She reports that she has been smoking cigarettes. She has a 0.40 pack-year smoking history. She has never used smokeless tobacco.  Tobacco Cessation Action Plan:   pt trying to quit on her own      Counseling Resources:  ATP IV Guidelines  Pooled Cohorts Equation " Calculator  Breast Cancer Risk Calculator  BRCA-Related Cancer Risk Assessment: FHS-7 Tool  FRAX Risk Assessment  ICSI Preventive Guidelines  Dietary Guidelines for Americans, 2010  USDA's MyPlate  ASA Prophylaxis  Lung CA Screening    [facility]

## 2021-10-28 DIAGNOSIS — E78.5 HYPERLIPIDEMIA LDL GOAL <100: Primary | ICD-10-CM

## 2021-10-28 DIAGNOSIS — B96.89 BACTERIAL VAGINOSIS: ICD-10-CM

## 2021-10-28 DIAGNOSIS — N76.0 BACTERIAL VAGINOSIS: ICD-10-CM

## 2021-10-28 LAB
BKR LAB AP GYN ADEQUACY: NORMAL
BKR LAB AP GYN INTERPRETATION: NORMAL
BKR LAB AP HPV REFLEX: NORMAL
BKR LAB AP PREVIOUS ABNORMAL: NORMAL
PATH REPORT.COMMENTS IMP SPEC: NORMAL
PATH REPORT.RELEVANT HX SPEC: NORMAL

## 2021-10-28 RX ORDER — FENOFIBRATE 145 MG/1
145 TABLET, COATED ORAL DAILY
Qty: 30 TABLET | Refills: 3 | Status: SHIPPED | OUTPATIENT
Start: 2021-10-28 | End: 2022-07-28

## 2021-10-28 RX ORDER — METRONIDAZOLE 500 MG/1
500 TABLET ORAL 2 TIMES DAILY
Qty: 14 TABLET | Refills: 0 | Status: SHIPPED | OUTPATIENT
Start: 2021-10-28 | End: 2021-11-04

## 2021-11-01 LAB
HUMAN PAPILLOMA VIRUS 16 DNA: NEGATIVE
HUMAN PAPILLOMA VIRUS 18 DNA: NEGATIVE
HUMAN PAPILLOMA VIRUS FINAL DIAGNOSIS: NORMAL
HUMAN PAPILLOMA VIRUS OTHER HR: NEGATIVE

## 2022-07-13 ENCOUNTER — MYC MEDICAL ADVICE (OUTPATIENT)
Dept: FAMILY MEDICINE | Facility: CLINIC | Age: 52
End: 2022-07-13

## 2022-07-13 NOTE — TELEPHONE ENCOUNTER
Patient Quality Outreach    Patient is due for the following:   Breast Cancer Screening - Mammogram    NEXT STEPS:   Schedule a office visit for HTN    Type of outreach:    Sent [employer] message.      Questions for provider review:    None     Leslie Reyes MA  Chart routed to Care Team.

## 2022-07-28 ENCOUNTER — OFFICE VISIT (OUTPATIENT)
Dept: DERMATOLOGY | Facility: CLINIC | Age: 52
End: 2022-07-28
Payer: COMMERCIAL

## 2022-07-28 DIAGNOSIS — L72.0 MILIA: ICD-10-CM

## 2022-07-28 DIAGNOSIS — L82.0 INFLAMED SEBORRHEIC KERATOSIS: Primary | ICD-10-CM

## 2022-07-28 PROCEDURE — 99213 OFFICE O/P EST LOW 20 MIN: CPT | Mod: 25 | Performed by: PHYSICIAN ASSISTANT

## 2022-07-28 PROCEDURE — 17110 DESTRUCTION B9 LES UP TO 14: CPT | Performed by: PHYSICIAN ASSISTANT

## 2022-07-28 ASSESSMENT — PAIN SCALES - GENERAL: PAINLEVEL: NO PAIN (0)

## 2022-07-28 NOTE — PROGRESS NOTES
HPI:   Chief complaints: Elisabet Dominguez is a pleasant 52 year old female who presents for treatment of seborrheic keratoses on the face. They are irritated.       PHYSICAL EXAM:    There were no vitals taken for this visit.  Skin exam performed as follows: Type 2 skin. Mood appropriate  Alert and Oriented X 3. Well developed, well nourished in no distress.  General appearance: Normal  Head including face: Normal  Eyes: conjunctiva and lids: Normal  Mouth: Lips, teeth, gums: Normal  Neck: Normal  Cardiovascular: Exam of peripheral vascular system by observation for swelling, varicosities, edema: Normal  Right upper extremity: Normal  Left upper extremity: Normal  Right lower extremity: Normal  Left lower extremity: Normal  Skin: Scalp and body hair: See below    Keratotic papules on the forehead x 5, right lower cheek x 2, left lower cheek x 2  Pearly white papule on the right cheek     ASSESSMENT/PLAN:     1. Inflamed seborrheic keratosis on the forehead x 5, right lower cheek x 2, left lower cheek x 2. As physically tender cryosurgery performed. Advised on post op care.   2. Milia on the right cheek. Advised benign; discussed cosmetic extraction if desired. She declines this today.             Follow-up: PRN  CC:   Scribed By: Arelis Bustillo, MS, [place]AG[place]

## 2022-07-28 NOTE — LETTER
7/28/2022         RE: Elisabet Dominguez  [address] [place] [address]  [place] [address]        Dear Colleague,    Thank you for referring your patient, Elisabet Dominguez, to the [facility]. Please see a copy of my visit note below.    HPI:   Chief complaints: Elisabet Dominguez is a pleasant 52 year old female who presents for treatment of seborrheic keratoses on the face. They are irritated.       PHYSICAL EXAM:    There were no vitals taken for this visit.  Skin exam performed as follows: Type 2 skin. Mood appropriate  Alert and Oriented X 3. Well developed, well nourished in no distress.  General appearance: Normal  Head including face: Normal  Eyes: conjunctiva and lids: Normal  Mouth: Lips, teeth, gums: Normal  Neck: Normal  Cardiovascular: Exam of peripheral vascular system by observation for swelling, varicosities, edema: Normal  Right upper extremity: Normal  Left upper extremity: Normal  Right lower extremity: Normal  Left lower extremity: Normal  Skin: Scalp and body hair: See below    Keratotic papules on the forehead x 5, right lower cheek x 2, left lower cheek x 2  Pearly white papule on the right cheek     ASSESSMENT/PLAN:     1. Inflamed seborrheic keratosis on the forehead x 5, right lower cheek x 2, left lower cheek x 2. As physically tender cryosurgery performed. Advised on post op care.   2. Milia on the right cheek. Advised benign; discussed cosmetic extraction if desired. She declines this today.             Follow-up: PRN  CC:   Scribed By: Arelis Bustillo, MS, [place]AG[place]          Again, thank you for allowing me to participate in the care of your patient.        Sincerely,        Arelis Bustillo PA-C

## 2022-08-01 DIAGNOSIS — I10 ESSENTIAL HYPERTENSION: ICD-10-CM

## 2022-08-01 NOTE — TELEPHONE ENCOUNTER
"Requested Prescriptions   Pending Prescriptions Disp Refills    losartan-hydrochlorothiazide (HYZAAR) 100-25 MG tablet 90 tablet 3     Sig: Take 1 tablet by mouth daily        Angiotensin-II Receptors Failed - 8/1/2022 10:40 AM        Failed - Normal serum creatinine on file in past 12 months     Recent Labs   Lab Test 10/26/21  0747   CR 1.23*       Ok to refill medication if creatinine is low          Passed - Last blood pressure under 140/90 in past 12 months       BP Readings from Last 3 Encounters:   10/26/21 98/62   10/19/21 (!) 168/86   08/26/21 (!) 160/96                 Passed - Recent (12 mo) or future (30 days) visit within the authorizing provider's specialty     Patient has had an office visit with the authorizing provider or a provider within the authorizing providers department within the previous 12 mos or has a future within next 30 days. See \"Patient Info\" tab in inbasket, or \"Choose Columns\" in Meds & Orders section of the refill encounter.              Passed - Medication is active on med list        Passed - Patient is age 18 or older        Passed - No active pregnancy on record        Passed - Normal serum potassium on file in past 12 months       Recent Labs   Lab Test 10/26/21  0747   POTASSIUM 3.8                    Passed - No positive pregnancy test in past 12 months       Diuretics (Including Combos) Protocol Failed - 8/1/2022 10:40 AM        Failed - Normal serum creatinine on file in past 12 months       Recent Labs   Lab Test 10/26/21  0747   CR 1.23*              Passed - Blood pressure under 140/90 in past 12 months       BP Readings from Last 3 Encounters:   10/26/21 98/62   10/19/21 (!) 168/86   08/26/21 (!) 160/96                 Passed - Recent (12 mo) or future (30 days) visit within the authorizing provider's specialty     Patient has had an office visit with the authorizing provider or a provider within the authorizing providers department within the previous 12 mos or has a " "future within next 30 days. See \"Patient Info\" tab in inbasket, or \"Choose Columns\" in Meds & Orders section of the refill encounter.              Passed - Medication is active on med list        Passed - Patient is age 18 or older        Passed - No active pregancy on record        Passed - Normal serum potassium on file in past 12 months       Recent Labs   Lab Test 10/26/21  0747   POTASSIUM 3.8                    Passed - Normal serum sodium on file in past 12 months       Recent Labs   Lab Test 10/26/21  0747                 Passed - No positive pregnancy test in past 12 months              "

## 2022-08-03 RX ORDER — LOSARTAN POTASSIUM AND HYDROCHLOROTHIAZIDE 25; 100 MG/1; MG/1
1 TABLET ORAL DAILY
Qty: 90 TABLET | Refills: 0 | Status: SHIPPED | OUTPATIENT
Start: 2022-08-03 | End: 2023-03-23

## 2022-08-03 NOTE — TELEPHONE ENCOUNTER
LM on patient VM rx approved and sent to pharmacy, please call to schedule OV with new PCP. Anum Ruth on 8/3/2022 at 1:13 PM

## 2023-03-06 ENCOUNTER — ANCILLARY PROCEDURE (OUTPATIENT)
Dept: GENERAL RADIOLOGY | Facility: CLINIC | Age: 53
End: 2023-03-06
Attending: PODIATRIST
Payer: COMMERCIAL

## 2023-03-06 ENCOUNTER — OFFICE VISIT (OUTPATIENT)
Dept: PODIATRY | Facility: CLINIC | Age: 53
End: 2023-03-06
Payer: COMMERCIAL

## 2023-03-06 VITALS — OXYGEN SATURATION: 100 % | BODY MASS INDEX: 34.87 KG/M2 | HEART RATE: 94 BPM | WEIGHT: 200 LBS

## 2023-03-06 DIAGNOSIS — S93.491A SPRAIN OF OTHER LIGAMENT OF RIGHT ANKLE, INITIAL ENCOUNTER: ICD-10-CM

## 2023-03-06 DIAGNOSIS — M25.871 ANKLE IMPINGEMENT SYNDROME, RIGHT: ICD-10-CM

## 2023-03-06 DIAGNOSIS — S93.491A SPRAIN OF OTHER LIGAMENT OF RIGHT ANKLE, INITIAL ENCOUNTER: Primary | ICD-10-CM

## 2023-03-06 PROCEDURE — 73610 X-RAY EXAM OF ANKLE: CPT | Mod: TC | Performed by: RADIOLOGY

## 2023-03-06 PROCEDURE — 99204 OFFICE O/P NEW MOD 45 MIN: CPT | Performed by: PODIATRIST

## 2023-03-06 NOTE — LETTER
3/6/2023         RE: Elisabet Dominguez  [address]  [place] [address]        Dear Colleague,    Thank you for referring your patient, Elisabet Dominguez, to the [facility]. Please see a copy of my visit note below.    Assessment:      ICD-10-CM    1. Sprain of other ligament of right ankle, initial encounter  S93.491A XR Ankle Right G/E 3 Views     Physical Therapy Referral     diclofenac (VOLTAREN) 50 MG EC tablet     Ankle/Foot Bracing Supplies DME Ankle Brace; Right      2. Ankle impingement syndrome, right  M25.871 Physical Therapy Referral     diclofenac (VOLTAREN) 50 MG EC tablet     Ankle/Foot Bracing Supplies DME Ankle Brace; Right          Plan:  Orders Placed This Encounter   Procedures     XR Ankle Right G/E 3 Views     Physical Therapy Referral     Ankle/Foot Bracing Supplies DME Ankle Brace; Right       Discussed the etiology and treatment of the condition with the patient.  Imaging studies reviewed and discussed with the patient.  Discussed surgical and conservative options.      Lateral sprain  Anteromedial impingement    -Injection- to ankle joint discussed if needed  -NSAID- Rx po  -Brace- ASO Rx today - cinch into eversion  -PT- Referral   -Activity- avoid uneven ground descents   -WB- full    MRI if not improved      Return:  No follow-ups on file.    Rozina Jacob DPM                Chief Complaint:     Patient presents with:  Right Ankle - Pain     right ankle injury.    HPI:  Elisabet Dominguez is a 52 year old year old female who presents for evaluation of ankle injury.    Date of injury- 2/6/23  Mechanism of injury- inversion.  Occurred while on ice  Pain location- front of ankle    States stairs are worst  First sprain recently but used to dance and in high school sprained it    Walks for exercise    Wore compression ankle sleeve but no boot      Past Medical & Surgical History:  Past Medical History:   Diagnosis Date     Human papillomavirus in  conditions classified elsewhere and of unspecified site 1995    abnoraml pap-had Cryo     Hypertension goal BP (blood pressure) < 140/90      Obesity       Past Surgical History:   Procedure Laterality Date     CRYOTHERAPY, CERVICAL  1995      Family History   Problem Relation Age of Onset     C.A.D. Mother 63     Arthritis Mother      Hypertension Father      Cancer Father         bladder     C.A.D. Maternal Grandmother      C.A.D. Maternal Aunt      C.A.D. Maternal Uncle      Breast Cancer No family hx of      Cancer - colorectal No family hx of      Diabetes No family hx of         Social History:  ?  History   Smoking Status     Every Day     Packs/day: 0.02     Years: 20.00     Types: Cigarettes   Smokeless Tobacco     Never     Comment: 2 per day     History   Drug Use No     Social History    Substance and Sexual Activity      Alcohol use: Yes        Alcohol/week: 2.0 standard drinks        Types: 2 Standard drinks or equivalent per week      Allergies:  ?   Allergies   Allergen Reactions     Atorvastatin      Lisinopril Cough        Medications:    Current Outpatient Medications   Medication     diclofenac (VOLTAREN) 50 MG EC tablet     losartan-hydrochlorothiazide (HYZAAR) 100-25 MG tablet     No current facility-administered medications for this visit.       Physical Exam:  ?  Vitals:  Pulse 94   Wt 90.7 kg (200 lb)   SpO2 100%   BMI 34.87 kg/m     General:  WD/WN, in NAD.  A&O x3.  Dermatologic:    Skin is intact, open lesions absent.   Bruising absent.  Skin texture, turgor is normal.  Vascular:  Pulses palpable bilateral.  Digital capillary refill time normal bilateral.  Skin temperature is normal right.  Generalized edema- none bilateral..  Focal edema- mild anteorlateral ankle right..  Neurologic:    Gross sensation normal.  Gait and balance normal.  Musculoskeletal:  Maximal pain to palpation of anteromedial ankle joint right.  mild pain to palpation of anteorlateral ankle joint right  No pain to  palpation of peroneals, R  Ankle ROM full, pain free right.  Anterior drawer stable bilateral.  Talar tilt increased right.    Manual Muscle Testing of peroneals  pain free 5/5  right.    Patient is able to bear weight on the injured extremity.  Patient is able to walk on the injured extremity.    Imaging:  x-ray independently reviewed and interpreted by myself today.  Weight-bearing views right ankle dated 03/06/23, reveal no fracture or significant degeneration, ant osseous impingement visible on Lateral, neutral foot type.  Calc inclination 26 deg but DF 1st ray on lateral - lat meary's 13 deg          Again, thank you for allowing me to participate in the care of your patient.        Sincerely,        Rozina Jacob DPM

## 2023-03-06 NOTE — PATIENT INSTRUCTIONS
PATIENT INSTRUCTIONS - Podiatry / Foot & Ankle Surgery      Lateral ankle sprain, ankle impingement      Size 8 ASO ankle brace      Ankle Sprain    -Use an ankle ASO (ankle sport orthosis) or TriLok type ankle brace:  For significant or athletic activity (especially uneven ground or hills)  For the next few months   -Use compression underneath the boot or brace (sock, sleeve, or Tubigrip).    -Schedule an NSAID by mouth every day for 1-2 weeks.  Call if a prescription strength NSAID (Diclofenac, Meloxicam) is desired  -Physical Therapy referral today.  -For persistent pain, swelling, or instability after rehabilitation (3+ months), return for repeat exam        Diclofenac / Voltaren - 1 pill twice daily x1 week.  Then take a 1 week break.  Repeat as needed.  Take with food & an acid blocker if stomach upset occurs.  Stop all other NSAIDs (aspirin, ibuprofen/Motrin, naproxen/ Aleve).      Physical Therapy  You have been referred to [facility].  Locations can be found online.  Please call to schedule an appointment:  [phone]            I can perform cortisone injection to the ankle if not improved

## 2023-03-06 NOTE — PROGRESS NOTES
Assessment:      ICD-10-CM    1. Sprain of other ligament of right ankle, initial encounter  S93.491A XR Ankle Right G/E 3 Views     Physical Therapy Referral     diclofenac (VOLTAREN) 50 MG EC tablet     Ankle/Foot Bracing Supplies DME Ankle Brace; Right      2. Ankle impingement syndrome, right  M25.871 Physical Therapy Referral     diclofenac (VOLTAREN) 50 MG EC tablet     Ankle/Foot Bracing Supplies DME Ankle Brace; Right          Plan:  Orders Placed This Encounter   Procedures     XR Ankle Right G/E 3 Views     Physical Therapy Referral     Ankle/Foot Bracing Supplies DME Ankle Brace; Right       Discussed the etiology and treatment of the condition with the patient.  Imaging studies reviewed and discussed with the patient.  Discussed surgical and conservative options.      Lateral sprain  Anteromedial impingement    -Injection- to ankle joint discussed if needed  -NSAID- Rx po  -Brace- ASO Rx today - cinch into eversion  -PT- Referral   -Activity- avoid uneven ground descents   -WB- full    MRI if not improved      Return:  No follow-ups on file.    Rozina Jacob DPM                Chief Complaint:     Patient presents with:  Right Ankle - Pain     right ankle injury.    HPI:  Elisabet Dominguez is a 52 year old year old female who presents for evaluation of ankle injury.    Date of injury- 2/6/23  Mechanism of injury- inversion.  Occurred while on ice  Pain location- front of ankle    States stairs are worst  First sprain recently but used to dance and in high school sprained it    Walks for exercise    Wore compression ankle sleeve but no boot      Past Medical & Surgical History:  Past Medical History:   Diagnosis Date     Human papillomavirus in conditions classified elsewhere and of unspecified site 1995    abnoraml pap-had Cryo     Hypertension goal BP (blood pressure) < 140/90      Obesity       Past Surgical History:   Procedure Laterality Date     CRYOTHERAPY, CERVICAL  1995      Family History    Problem Relation Age of Onset     C.A.D. Mother 63     Arthritis Mother      Hypertension Father      Cancer Father         bladder     C.A.D. Maternal Grandmother      KESHIA.A.MALENA. Maternal Aunt      C.A.MALENA. Maternal Uncle      Breast Cancer No family hx of      Cancer - colorectal No family hx of      Diabetes No family hx of         Social History:  ?  History   Smoking Status     Every Day     Packs/day: 0.02     Years: 20.00     Types: Cigarettes   Smokeless Tobacco     Never     Comment: 2 per day     History   Drug Use No     Social History    Substance and Sexual Activity      Alcohol use: Yes        Alcohol/week: 2.0 standard drinks        Types: 2 Standard drinks or equivalent per week      Allergies:  ?   Allergies   Allergen Reactions     Atorvastatin      Lisinopril Cough        Medications:    Current Outpatient Medications   Medication     diclofenac (VOLTAREN) 50 MG EC tablet     losartan-hydrochlorothiazide (HYZAAR) 100-25 MG tablet     No current facility-administered medications for this visit.       Physical Exam:  ?  Vitals:  Pulse 94   Wt 90.7 kg (200 lb)   SpO2 100%   BMI 34.87 kg/m     General:  WD/WN, in NAD.  A&O x3.  Dermatologic:    Skin is intact, open lesions absent.   Bruising absent.  Skin texture, turgor is normal.  Vascular:  Pulses palpable bilateral.  Digital capillary refill time normal bilateral.  Skin temperature is normal right.  Generalized edema- none bilateral..  Focal edema- mild anteorlateral ankle right..  Neurologic:    Gross sensation normal.  Gait and balance normal.  Musculoskeletal:  Maximal pain to palpation of anteromedial ankle joint right.  mild pain to palpation of anteorlateral ankle joint right  No pain to palpation of peroneals, R  Ankle ROM full, pain free right.  Anterior drawer stable bilateral.  Talar tilt increased right.    Manual Muscle Testing of peroneals  pain free 5/5  right.    Patient is able to bear weight on the injured extremity.  Patient is  able to walk on the injured extremity.    Imaging:  x-ray independently reviewed and interpreted by myself today.  Weight-bearing views right ankle dated 03/06/23, reveal no fracture or significant degeneration, ant osseous impingement visible on Lateral, neutral foot type.  Calc inclination 26 deg but DF 1st ray on lateral - lat meary's 13 deg

## 2023-03-22 DIAGNOSIS — I10 ESSENTIAL HYPERTENSION: ICD-10-CM

## 2023-03-22 NOTE — TELEPHONE ENCOUNTER
Pending Prescriptions:                       Disp   Refills    losartan-hydrochlorothiazide (HYZAAR) 100*90 tab*0            Sig: Take 1 tablet by mouth daily

## 2023-03-23 RX ORDER — LOSARTAN POTASSIUM AND HYDROCHLOROTHIAZIDE 25; 100 MG/1; MG/1
1 TABLET ORAL DAILY
Qty: 30 TABLET | Refills: 0 | Status: SHIPPED | OUTPATIENT
Start: 2023-03-23 | End: 2023-04-12

## 2023-03-23 NOTE — TELEPHONE ENCOUNTER
"Elisabet is due for a Yearly Preventative visit with refills. Please ask Elisabet if she has enough meds to last until her appointment. Thank you!    Denia Gupta, RN      Requested Prescriptions   Pending Prescriptions Disp Refills     losartan-hydrochlorothiazide (HYZAAR) 100-25 MG tablet 90 tablet 0     Sig: Take 1 tablet by mouth daily       Angiotensin-II Receptors Failed - 3/22/2023  3:51 PM        Failed - Last blood pressure under 140/90 in past 12 months     BP Readings from Last 3 Encounters:   10/26/21 98/62   10/19/21 (!) 168/86   08/26/21 (!) 160/96                 Failed - Recent (12 mo) or future (30 days) visit within the authorizing provider's specialty     Patient has had an office visit with the authorizing provider or a provider within the authorizing providers department within the previous 12 mos or has a future within next 30 days. See \"Patient Info\" tab in inbasket, or \"Choose Columns\" in Meds & Orders section of the refill encounter.              Failed - Normal serum creatinine on file in past 12 months     Recent Labs   Lab Test 10/26/21  0747   CR 1.23*       Ok to refill medication if creatinine is low          Failed - Normal serum potassium on file in past 12 months     Recent Labs   Lab Test 10/26/21  0747   POTASSIUM 3.8                    Passed - Medication is active on med list        Passed - Patient is age 18 or older        Passed - No active pregnancy on record        Passed - No positive pregnancy test in past 12 months       Diuretics (Including Combos) Protocol Failed - 3/22/2023  3:51 PM        Failed - Blood pressure under 140/90 in past 12 months     BP Readings from Last 3 Encounters:   10/26/21 98/62   10/19/21 (!) 168/86   08/26/21 (!) 160/96                 Failed - Recent (12 mo) or future (30 days) visit within the authorizing provider's specialty     Patient has had an office visit with the authorizing provider or a provider within the authorizing providers " "department within the previous 12 mos or has a future within next 30 days. See \"Patient Info\" tab in inbasket, or \"Choose Columns\" in Meds & Orders section of the refill encounter.              Failed - Normal serum creatinine on file in past 12 months     Recent Labs   Lab Test 10/26/21  0747   CR 1.23*              Failed - Normal serum potassium on file in past 12 months     Recent Labs   Lab Test 10/26/21  0747   POTASSIUM 3.8                    Failed - Normal serum sodium on file in past 12 months     Recent Labs   Lab Test 10/26/21  0747                 Passed - Medication is active on med list        Passed - Patient is age 18 or older        Passed - No active pregancy on record        Passed - No positive pregnancy test in past 12 months             "

## 2023-03-23 NOTE — TELEPHONE ENCOUNTER
Patient Returning Call    Reason for call:  Patient called back to schedule next available annual for 4/4/23 she will need refills on her BP medication prior to this visit     Information relayed to patient:  Will message team in regard to medication    Patient has additional questions:  No    What are your questions/concerns:  n/a    Could we send this information to you in [employer] or would you prefer to receive a phone call?:   Patient would prefer a phone call   Okay to leave a detailed message?: Yes at Cell number on file:    Telephone Information:   Mobile [phone]

## 2023-04-06 DIAGNOSIS — M25.871 ANKLE IMPINGEMENT SYNDROME, RIGHT: ICD-10-CM

## 2023-04-06 DIAGNOSIS — S93.491A SPRAIN OF OTHER LIGAMENT OF RIGHT ANKLE, INITIAL ENCOUNTER: ICD-10-CM

## 2023-04-11 ASSESSMENT — ENCOUNTER SYMPTOMS
NAUSEA: 0
CHILLS: 0
FREQUENCY: 0
JOINT SWELLING: 1
SHORTNESS OF BREATH: 0
PARESTHESIAS: 0
DYSURIA: 0
BREAST MASS: 0
DIZZINESS: 0
NERVOUS/ANXIOUS: 0
ABDOMINAL PAIN: 0
MYALGIAS: 0
CONSTIPATION: 0
EYE PAIN: 0
COUGH: 0
DIARRHEA: 0
HEMATURIA: 0
HEMATOCHEZIA: 0
SORE THROAT: 0
HEADACHES: 0
ARTHRALGIAS: 1
PALPITATIONS: 0
WEAKNESS: 0
FEVER: 0
HEARTBURN: 0

## 2023-04-12 ENCOUNTER — OFFICE VISIT (OUTPATIENT)
Dept: FAMILY MEDICINE | Facility: CLINIC | Age: 53
End: 2023-04-12
Payer: COMMERCIAL

## 2023-04-12 VITALS
DIASTOLIC BLOOD PRESSURE: 80 MMHG | BODY MASS INDEX: 34.91 KG/M2 | OXYGEN SATURATION: 96 % | RESPIRATION RATE: 16 BRPM | HEIGHT: 63 IN | SYSTOLIC BLOOD PRESSURE: 122 MMHG | WEIGHT: 197 LBS | TEMPERATURE: 98.8 F | HEART RATE: 90 BPM

## 2023-04-12 DIAGNOSIS — Z00.00 ROUTINE GENERAL MEDICAL EXAMINATION AT A HEALTH CARE FACILITY: Primary | ICD-10-CM

## 2023-04-12 DIAGNOSIS — Z12.11 SCREEN FOR COLON CANCER: ICD-10-CM

## 2023-04-12 DIAGNOSIS — Z12.31 VISIT FOR SCREENING MAMMOGRAM: ICD-10-CM

## 2023-04-12 DIAGNOSIS — I10 ESSENTIAL HYPERTENSION: ICD-10-CM

## 2023-04-12 LAB
ANION GAP SERPL CALCULATED.3IONS-SCNC: 13 MMOL/L (ref 7–15)
BUN SERPL-MCNC: 20.8 MG/DL (ref 6–20)
CALCIUM SERPL-MCNC: 10.6 MG/DL (ref 8.6–10)
CHLORIDE SERPL-SCNC: 100 MMOL/L (ref 98–107)
CHOLEST SERPL-MCNC: 258 MG/DL
CREAT SERPL-MCNC: 1.04 MG/DL (ref 0.51–0.95)
DEPRECATED HCO3 PLAS-SCNC: 24 MMOL/L (ref 22–29)
GFR SERPL CREATININE-BSD FRML MDRD: 64 ML/MIN/1.73M2
GLUCOSE SERPL-MCNC: 100 MG/DL (ref 70–99)
HDLC SERPL-MCNC: 57 MG/DL
LDLC SERPL CALC-MCNC: ABNORMAL MG/DL
LDLC SERPL DIRECT ASSAY-MCNC: 116 MG/DL
NONHDLC SERPL-MCNC: 201 MG/DL
POTASSIUM SERPL-SCNC: 4 MMOL/L (ref 3.4–5.3)
SODIUM SERPL-SCNC: 137 MMOL/L (ref 136–145)
TRIGL SERPL-MCNC: 489 MG/DL

## 2023-04-12 PROCEDURE — 90472 IMMUNIZATION ADMIN EACH ADD: CPT | Performed by: NURSE PRACTITIONER

## 2023-04-12 PROCEDURE — 90750 HZV VACC RECOMBINANT IM: CPT | Performed by: NURSE PRACTITIONER

## 2023-04-12 PROCEDURE — 90677 PCV20 VACCINE IM: CPT | Performed by: NURSE PRACTITIONER

## 2023-04-12 PROCEDURE — 80061 LIPID PANEL: CPT | Performed by: NURSE PRACTITIONER

## 2023-04-12 PROCEDURE — 80048 BASIC METABOLIC PNL TOTAL CA: CPT | Performed by: NURSE PRACTITIONER

## 2023-04-12 PROCEDURE — 83721 ASSAY OF BLOOD LIPOPROTEIN: CPT | Mod: 59 | Performed by: NURSE PRACTITIONER

## 2023-04-12 PROCEDURE — 99396 PREV VISIT EST AGE 40-64: CPT | Mod: 25 | Performed by: NURSE PRACTITIONER

## 2023-04-12 PROCEDURE — 99213 OFFICE O/P EST LOW 20 MIN: CPT | Mod: 25 | Performed by: NURSE PRACTITIONER

## 2023-04-12 PROCEDURE — 90471 IMMUNIZATION ADMIN: CPT | Performed by: NURSE PRACTITIONER

## 2023-04-12 PROCEDURE — 36415 COLL VENOUS BLD VENIPUNCTURE: CPT | Performed by: NURSE PRACTITIONER

## 2023-04-12 RX ORDER — LOSARTAN POTASSIUM AND HYDROCHLOROTHIAZIDE 25; 100 MG/1; MG/1
1 TABLET ORAL DAILY
Qty: 90 TABLET | Refills: 3 | Status: SHIPPED | OUTPATIENT
Start: 2023-04-12 | End: 2024-04-19

## 2023-04-12 ASSESSMENT — ENCOUNTER SYMPTOMS
CHILLS: 0
HEARTBURN: 0
COUGH: 0
EYE PAIN: 0
NERVOUS/ANXIOUS: 0
BREAST MASS: 0
DIZZINESS: 0
HEMATOCHEZIA: 0
SHORTNESS OF BREATH: 0
ABDOMINAL PAIN: 0
PARESTHESIAS: 0
JOINT SWELLING: 1
WEAKNESS: 0
HEADACHES: 0
CONSTIPATION: 0
DIARRHEA: 0
FEVER: 0
FREQUENCY: 0
NAUSEA: 0
PALPITATIONS: 0
DYSURIA: 0
HEMATURIA: 0
MYALGIAS: 0
SORE THROAT: 0
ARTHRALGIAS: 1

## 2023-04-12 ASSESSMENT — PAIN SCALES - GENERAL: PAINLEVEL: NO PAIN (0)

## 2023-04-12 NOTE — NURSING NOTE
Prior to immunization administration, verified patients identity using patient s name and date of birth. Please see Immunization Activity for additional information.     Screening Questionnaire for Adult Immunization    Are you sick today?   No   Do you have allergies to medications, food, a vaccine component or latex?   No   Have you ever had a serious reaction after receiving a vaccination?   No   Do you have a long-term health problem with heart, lung, kidney, or metabolic disease (e.g., diabetes), asthma, a blood disorder, no spleen, complement component deficiency, a cochlear implant, or a spinal fluid leak?  Are you on long-term aspirin therapy?   No   Do you have cancer, leukemia, HIV/AIDS, or any other immune system problem?   No   Do you have a parent, brother, or sister with an immune system problem?   No   In the past 3 months, have you taken medications that affect  your immune system, such as prednisone, other steroids, or anticancer drugs; drugs for the treatment of rheumatoid arthritis, Crohn s disease, or psoriasis; or have you had radiation treatments?   No   Have you had a seizure, or a brain or other nervous system problem?   No   During the past year, have you received a transfusion of blood or blood    products, or been given immune (gamma) globulin or antiviral drug?   No   For women: Are you pregnant or is there a chance you could become       pregnant during the next month?   No   Have you received any vaccinations in the past 4 weeks?   No     Immunization questionnaire answers were all negative.      Injection of shingrix and pneumovax given by Jordana Locke CMA. Patient instructed to remain in clinic for 15 minutes afterwards, and to report any adverse reactions.     Screening performed by Jordana Locke CMA on 4/12/2023 at 8:33 AM.

## 2023-04-12 NOTE — LETTER
April 14, 2023      Elisabet Dominguez  [address] [address] [address]   [place] [address]        Dear ,    We are writing to inform you of your test results.    Kidney function and electrolytes are normal.   Cholesterol is elevated.   Recommend trying another cholesterol medication as we discussed in clinic. I sent in a prescription for rosuvastatin 20 mg daily.   Follow up with me for a recheck in 6 weeks.     Resulted Orders   Basic metabolic panel  (Ca, Cl, CO2, Creat, Gluc, K, Na, BUN)   Result Value Ref Range    Sodium 137 136 - 145 mmol/L    Potassium 4.0 3.4 - 5.3 mmol/L    Chloride 100 98 - 107 mmol/L    Carbon Dioxide (CO2) 24 22 - 29 mmol/L    Anion Gap 13 7 - 15 mmol/L    Urea Nitrogen 20.8 (H) 6.0 - 20.0 mg/dL    Creatinine 1.04 (H) 0.51 - 0.95 mg/dL    Calcium 10.6 (H) 8.6 - 10.0 mg/dL    Glucose 100 (H) 70 - 99 mg/dL    GFR Estimate 64 >60 mL/min/1.73m2      Comment:      eGFR calculated using 2021 CKD-EPI equation.   Lipid panel reflex to direct LDL Fasting   Result Value Ref Range    Cholesterol 258 (H) <200 mg/dL    Triglycerides 489 (H) <150 mg/dL    Direct Measure HDL 57 >=50 mg/dL    LDL Cholesterol Calculated        Comment:      Cannot estimate LDL when triglyceride exceeds 400 mg/dL    Non HDL Cholesterol 201 (H) <130 mg/dL    Narrative    Cholesterol  Desirable:  <200 mg/dL    Triglycerides  Normal:  Less than 150 mg/dL  Borderline High:  150-199 mg/dL  High:  200-499 mg/dL  Very High:  Greater than or equal to 500 mg/dL    Direct Measure HDL  Female:  Greater than or equal to 50 mg/dL   Male:  Greater than or equal to 40 mg/dL    LDL Cholesterol  Desirable:  <100mg/dL  Above Desirable:  100-129 mg/dL   Borderline High:  130-159 mg/dL   High:  160-189 mg/dL   Very High:  >= 190 mg/dL    Non HDL Cholesterol  Desirable:  130 mg/dL  Above Desirable:  130-159 mg/dL  Borderline High:  160-189 mg/dL  High:  190-219 mg/dL  Very High:  Greater than or equal to 220 mg/dL   LDL cholesterol direct    Result Value Ref Range    LDL Cholesterol Direct 116 (H) <100 mg/dL      Comment:      Age 2-19 years:  Desirable: 0-110 mg/dL   Borderline high: 110-129 mg/dL   High: >= 130 mg/dL    Age 20 years and older:  Desirable: <100mg/dL  Above desirable: 100-129 mg/dL   Borderline high: 130-159 mg/dL   High: 160-189 mg/dL   Very high: >= 190 mg/dL       If you have any questions or concerns, please call the clinic at the number listed above.       Sincerely,      AGA Crump CNP

## 2023-04-12 NOTE — PROGRESS NOTES
SUBJECTIVE:   CC: Elisabet is an 53 year old who presents for preventive health visit.        View : No data to display.              Patient has been advised of split billing requirements and indicates understanding: Yes  Healthy Habits:     Getting at least 3 servings of Calcium per day:  NO    Bi-annual eye exam:  Yes    Dental care twice a year:  Yes    Sleep apnea or symptoms of sleep apnea:  None    Diet:  Regular (no restrictions)    Frequency of exercise:  2-3 days/week    Duration of exercise:  15-30 minutes    Taking medications regularly:  Yes    Medication side effects:  Not applicable    PHQ-2 Total Score: 0    Additional concerns today:  No        PROBLEMS TO ADD ON...    HTN- refill medication  Doesn't check blood pressures at home  Does not follow low salt diet      Today's PHQ-2 Score:       4/11/2023     1:53 PM   PHQ-2 ( 1999 [employer])   Q1: Little interest or pleasure in doing things 0   Q2: Feeling down, depressed or hopeless 0   PHQ-2 Score 0   Q1: Little interest or pleasure in doing things Not at all   Q2: Feeling down, depressed or hopeless Not at all   PHQ-2 Score 0           Social History     Tobacco Use     Smoking status: Every Day     Packs/day: 0.02     Years: 20.00     Pack years: 0.40     Types: Cigarettes     Smokeless tobacco: Never     Tobacco comments:     2 per day   Vaping Use     Vaping status: Never Used   Substance Use Topics     Alcohol use: Yes     Alcohol/week: 2.0 standard drinks of alcohol     Types: 2 Standard drinks or equivalent per week             4/11/2023     1:52 PM   Alcohol Use   Prescreen: >3 drinks/day or >7 drinks/week? Yes   AUDIT SCORE  4     Reviewed orders with patient.  Reviewed health maintenance and updated orders accordingly - Yes      Breast Cancer Screening:        10/26/2021     6:59 AM   Breast CA Risk Assessment (FHS-7)   Do you have a family history of breast, colon, or ovarian cancer? No / Unknown       Mammogram Screening: Recommended annual  "mammography  Pertinent mammograms are reviewed under the imaging tab.    History of abnormal Pap smear: NO - age 30-65 PAP every 5 years with negative HPV co-testing recommended      Latest Ref Rng & Units 10/26/2021     7:22 AM 3/23/2018    11:56 AM 3/23/2018    11:30 AM   PAP / HPV   PAP  Negative for Intraepithelial Lesion or Malignancy (NILM)       PAP (Historical)   NIL      HPV 16 DNA Negative Negative    Negative     HPV 18 DNA Negative Negative    Negative     Other HR HPV Negative Negative    Negative       Reviewed and updated as needed this visit by clinical staff   Tobacco  Allergies  Meds              Reviewed and updated as needed this visit by Provider                     Review of Systems   Constitutional: Negative for chills and fever.   HENT: Negative for congestion, ear pain, hearing loss and sore throat.    Eyes: Negative for pain and visual disturbance.   Respiratory: Negative for cough and shortness of breath.    Cardiovascular: Negative for chest pain, palpitations and peripheral edema.   Gastrointestinal: Negative for abdominal pain, constipation, diarrhea, heartburn, hematochezia and nausea.   Breasts:  Negative for tenderness, breast mass and discharge.   Genitourinary: Negative for dysuria, frequency, genital sores, hematuria, pelvic pain, urgency, vaginal bleeding and vaginal discharge.   Musculoskeletal: Positive for arthralgias and joint swelling. Negative for myalgias.   Skin: Negative for rash.   Neurological: Negative for dizziness, weakness, headaches and paresthesias.   Psychiatric/Behavioral: Negative for mood changes. The patient is not nervous/anxious.           OBJECTIVE:   /80 (BP Location: Right arm, Cuff Size: Adult Regular)   Pulse 90   Temp 98.8  F (37.1  C) (Tympanic)   Resp 16   Ht 1.6 m (5' 3\")   Wt 89.4 kg (197 lb)   LMP 12/01/2019   SpO2 96%   BMI 34.90 kg/m    Physical Exam  GENERAL APPEARANCE: healthy, alert and no distress  EYES: Eyes grossly " normal to inspection, PERRL and conjunctivae and sclerae normal  HENT: ear canals and TM's normal, nose and mouth without ulcers or lesions, oropharynx clear and oral mucous membranes moist  NECK: no adenopathy, no asymmetry, masses, or scars and thyroid normal to palpation  RESP: lungs clear to auscultation - no rales, rhonchi or wheezes  BREAST: normal without masses, tenderness or nipple discharge and no palpable axillary masses or adenopathy  CV: regular rate and rhythm, normal S1 S2, no S3 or S4, no murmur, click or rub, no peripheral edema and peripheral pulses strong  ABDOMEN: soft, nontender, no hepatosplenomegaly, no masses and bowel sounds normal  MS: no musculoskeletal defects are noted and gait is age appropriate without ataxia  SKIN: no suspicious lesions or rashes  NEURO: Normal strength and tone, sensory exam grossly normal, mentation intact and speech normal  PSYCH: mentation appears normal and affect normal/bright        ASSESSMENT/PLAN:       ICD-10-CM    1. Routine general medical examination at a health care facility  Z00.00 Lipid panel reflex to direct LDL Fasting      2. Essential hypertension  I10 Well controlled.  losartan-hydrochlorothiazide (HYZAAR) 100-25 MG tablet     Basic metabolic panel  (Ca, Cl, CO2, Creat, Gluc, K, Na, BUN)      3. Screen for colon cancer  Z12.11 Colonoscopy Screening  Referral      4. Visit for screening mammogram  Z12.31 MA SCREENING DIGITAL BILAT - Future  (s+30)          Patient has been advised of split billing requirements and indicates understanding: Yes by AFR and CMA      COUNSELING:  Reviewed preventive health counseling, as reflected in patient instructions      The risks, benefits and treatment options of prescribed medications or other treatments have been discussed with the patient. The patient verbalized their understanding and should call or follow up if no improvement or if they develop further problems.    AGA Crump CNP  M  [facility]

## 2023-04-13 DIAGNOSIS — E78.5 HYPERLIPIDEMIA LDL GOAL <100: Primary | ICD-10-CM

## 2023-04-13 RX ORDER — ROSUVASTATIN CALCIUM 20 MG/1
20 TABLET, COATED ORAL DAILY
Qty: 90 TABLET | Refills: 3 | Status: SHIPPED | OUTPATIENT
Start: 2023-04-13

## 2023-04-23 ENCOUNTER — HEALTH MAINTENANCE LETTER (OUTPATIENT)
Age: 53
End: 2023-04-23

## 2023-05-02 ENCOUNTER — HOSPITAL ENCOUNTER (OUTPATIENT)
Dept: MAMMOGRAPHY | Facility: CLINIC | Age: 53
Discharge: HOME | End: 2023-05-02
Attending: NURSE PRACTITIONER | Admitting: NURSE PRACTITIONER
Payer: COMMERCIAL

## 2023-05-02 ENCOUNTER — ANCILLARY ORDERS (OUTPATIENT)
Dept: FAMILY MEDICINE | Facility: CLINIC | Age: 53
End: 2023-05-02

## 2023-05-02 DIAGNOSIS — Z12.31 VISIT FOR SCREENING MAMMOGRAM: ICD-10-CM

## 2023-05-02 PROCEDURE — 77067 SCR MAMMO BI INCL CAD: CPT

## 2023-08-07 ENCOUNTER — TELEPHONE (OUTPATIENT)
Dept: FAMILY MEDICINE | Facility: CLINIC | Age: 53
End: 2023-08-07
Payer: COMMERCIAL

## 2023-08-07 NOTE — TELEPHONE ENCOUNTER
Patient Quality Outreach    Patient is due for the following:   Colon Cancer Screening    Next Steps:   Due for colon cancer screen     Type of outreach:    Sent letter.      Questions for provider review:    None           Chris Patton, CMA

## 2023-08-07 NOTE — LETTER
August 7, 2023      Elisabet Dominguez  [address] [address] [address]   [place] [address]        Dear Elisabet,       Your team at [facility] cares about your health. We have reviewed your chart and based on our findings; we are making the following recommendations to better manage your health.     You are in particular need of attention regarding the following: Colon Cancer Screening Test    Call or MyChart message your clinic to schedule a colonoscopy, schedule/ a FIT Test, or order a Cologuard test. If you are unsure what type of test you need, please call your clinic and speak to clinic staff.   Colon cancer is now the second leading cause of cancer-related deaths in the [facility] for both men and women and there are over 130,000 new cases and 50,000 deaths per year from colon cancer. Colonoscopies can prevent 90-95% of these deaths. Problem lesions can be removed before they ever become cancer. This test is not only looking for cancer, but also getting rid of precancerous lesions.   If you are under/uninsured, we recommend you contact the [employer] Program.[employer] is a free colorectal cancer screening program that provides colonoscopies for eligible under/uninsured [place] men and women. If you are interested in receiving a free colonoscopy, please call [employer] at t [phone] (mention code [employer]) to see if you're eligible. Please have them send us the results.     If you have already completed these items, please contact the clinic via phone or   [email or web address] so your care team can review and update your records. Thank you for   choosing [facility] for your healthcare needs. For any questions,   concerns, or to schedule an appointment please contact our clinic.      Sincerely,        AGA Crump CNP

## 2024-04-19 DIAGNOSIS — I10 ESSENTIAL HYPERTENSION: ICD-10-CM

## 2024-04-19 RX ORDER — LOSARTAN POTASSIUM AND HYDROCHLOROTHIAZIDE 25; 100 MG/1; MG/1
1 TABLET ORAL DAILY
Qty: 90 TABLET | Refills: 0 | Status: SHIPPED | OUTPATIENT
Start: 2024-04-19 | End: 2024-07-24

## 2024-04-19 NOTE — TELEPHONE ENCOUNTER
ANITRA on patient VM, rx approved for 90 days and sent to patient's pharmacy. Patient is due for preventive visit, asked to call Scheduling Line. Anum Ruth on 4/19/2024 at 2:03 PM

## 2024-04-26 DIAGNOSIS — I10 ESSENTIAL HYPERTENSION: ICD-10-CM

## 2024-04-26 RX ORDER — LOSARTAN POTASSIUM AND HYDROCHLOROTHIAZIDE 25; 100 MG/1; MG/1
1 TABLET ORAL DAILY
Qty: 90 TABLET | Refills: 0 | OUTPATIENT
Start: 2024-04-26

## 2024-06-30 ENCOUNTER — HEALTH MAINTENANCE LETTER (OUTPATIENT)
Age: 54
End: 2024-06-30

## 2024-07-24 ENCOUNTER — OFFICE VISIT (OUTPATIENT)
Dept: FAMILY MEDICINE | Facility: CLINIC | Age: 54
End: 2024-07-24
Payer: COMMERCIAL

## 2024-07-24 VITALS
HEIGHT: 63 IN | TEMPERATURE: 98.6 F | HEART RATE: 101 BPM | SYSTOLIC BLOOD PRESSURE: 136 MMHG | OXYGEN SATURATION: 97 % | BODY MASS INDEX: 34.62 KG/M2 | RESPIRATION RATE: 20 BRPM | WEIGHT: 195.4 LBS | DIASTOLIC BLOOD PRESSURE: 86 MMHG

## 2024-07-24 DIAGNOSIS — I10 ESSENTIAL HYPERTENSION: Primary | ICD-10-CM

## 2024-07-24 PROCEDURE — 99213 OFFICE O/P EST LOW 20 MIN: CPT

## 2024-07-24 PROCEDURE — 36415 COLL VENOUS BLD VENIPUNCTURE: CPT

## 2024-07-24 PROCEDURE — G2211 COMPLEX E/M VISIT ADD ON: HCPCS

## 2024-07-24 PROCEDURE — 80048 BASIC METABOLIC PNL TOTAL CA: CPT

## 2024-07-24 RX ORDER — LOSARTAN POTASSIUM AND HYDROCHLOROTHIAZIDE 25; 100 MG/1; MG/1
1 TABLET ORAL DAILY
Qty: 90 TABLET | Refills: 3 | Status: SHIPPED | OUTPATIENT
Start: 2024-07-24

## 2024-07-24 ASSESSMENT — ENCOUNTER SYMPTOMS: HYPERTENSION: 1

## 2024-07-24 NOTE — PROGRESS NOTES
"  Assessment & Plan     Essential hypertension  Patient has taken Hyzaar for a number of years and is well tolerated. Blood pressure stable today. Medication refilled for one year. BMP ordered.   - losartan-hydrochlorothiazide (HYZAAR) 100-25 MG tablet; Take 1 tablet by mouth daily  - Basic metabolic panel  (Ca, Cl, CO2, Creat, Gluc, K, Na, BUN); Future  - Basic metabolic panel  (Ca, Cl, CO2, Creat, Gluc, K, Na, BUN)    BMI  Estimated body mass index is 34.61 kg/m  as calculated from the following:    Height as of this encounter: 1.6 m (5' 3\").    Weight as of this encounter: 88.6 kg (195 lb 6.4 oz).         Cedric Bhandari is a 54 year old, presenting for the following health issues:  Recheck Medication and Hypertension      7/24/2024     1:47 PM   Additional Questions   Roomed by shayla castorena     Via the Health Maintenance questionnaire, the patient has reported the following services have been completed -Mammogram: yelitza 2022-01-03, this information has been sent to the abstraction team.  Hypertension     History of Present Illness       Hypertension: She presents for follow up of hypertension.  She does not check blood pressure  regularly outside of the clinic. Outpatient blood pressures have not been over 140/90. She does not follow a low salt diet.     She eats 0-1 servings of fruits and vegetables daily.She consumes 1 sweetened beverage(s) daily.She exercises with enough effort to increase her heart rate 10 to 19 minutes per day.  She exercises with enough effort to increase her heart rate 5 days per week.   She is taking medications regularly.                     Objective    /86 (BP Location: Right arm, Patient Position: Sitting, Cuff Size: Adult Regular)   Pulse 101   Temp 98.6  F (37  C) (Temporal)   Resp 20   Ht 1.6 m (5' 3\")   Wt 88.6 kg (195 lb 6.4 oz)   LMP 12/01/2019   SpO2 97%   BMI 34.61 kg/m    Body mass index is 34.61 kg/m .  Physical Exam   GENERAL: alert and no distress  RESP: lungs " clear to auscultation - no rales, rhonchi or wheezes  CV: regular rate and rhythm, normal S1 S2, no S3 or S4, no murmur, click or rub, no peripheral edema            Signed Electronically by: AGA Aldrich CNP

## 2024-07-25 LAB
ANION GAP SERPL CALCULATED.3IONS-SCNC: 10 MMOL/L (ref 7–15)
BUN SERPL-MCNC: 17.8 MG/DL (ref 6–20)
CALCIUM SERPL-MCNC: 10.2 MG/DL (ref 8.8–10.4)
CHLORIDE SERPL-SCNC: 101 MMOL/L (ref 98–107)
CREAT SERPL-MCNC: 1.08 MG/DL (ref 0.51–0.95)
EGFRCR SERPLBLD CKD-EPI 2021: 61 ML/MIN/1.73M2
GLUCOSE SERPL-MCNC: 115 MG/DL (ref 70–99)
HCO3 SERPL-SCNC: 26 MMOL/L (ref 22–29)
POTASSIUM SERPL-SCNC: 3.9 MMOL/L (ref 3.4–5.3)
SODIUM SERPL-SCNC: 137 MMOL/L (ref 135–145)

## 2024-07-25 NOTE — RESULT ENCOUNTER NOTE
Prakash Bhandari,     It was nice meeting you the other day.      Your kidney level is slightly elevated but it appears to be stable from the last two years. Because of this, we will continue to monitor it. Continue to take your medication as prescribed.      Feel free to contact me via [employer] or call the clinic at [phone].     Sincerely,  Rebeka Pulido, TULIO, FNP-C

## 2024-09-08 ENCOUNTER — HEALTH MAINTENANCE LETTER (OUTPATIENT)
Age: 54
End: 2024-09-08

## 2024-09-11 ENCOUNTER — TELEPHONE (OUTPATIENT)
Dept: FAMILY MEDICINE | Facility: CLINIC | Age: 54
End: 2024-09-11
Payer: COMMERCIAL

## 2024-09-11 NOTE — TELEPHONE ENCOUNTER
Patient Quality Outreach    Patient is due for the following:   Colon Cancer Screening  Breast Cancer Screening - Mammogram  Cervical Cancer Screening - PAP Needed  Physical Preventive Adult Physical      Topic Date Due    Hepatitis B Vaccine (1 of 3 - 19+ 3-dose series) Never done    Zoster (Shingles) Vaccine (2 of 2) 06/07/2023    Flu Vaccine (1) 09/01/2024    COVID-19 Vaccine (3 - 2023-24 season) 09/01/2024       Next Steps:   Schedule a Adult Preventative    Type of outreach:    Sent letter.      Questions for provider review:    None           Jordana Locke, CMA

## 2024-09-11 NOTE — LETTER
September 11, 2024    To  Elisabet Dominguez  [address] [place] [address]   [place] [address]    Your team at [facility] cares about your health. We have reviewed your chart and based on our findings; we are making the following recommendations to better manage your health.     You are in particular need of attention regarding the following:     Call or MyChart message your clinic to schedule a colonoscopy, schedule/ a FIT Test, or order a Cologuard test. If you are unsure what type of test you need, please call your clinic and speak to clinic staff.   Colon cancer is now the second leading cause of cancer-related deaths in the [place] for both men and women and there are over 130,000 new cases and 50,000 deaths per year from colon cancer. Colonoscopies can prevent 90-95% of these deaths. Problem lesions can be removed before they ever become cancer. This test is not only looking for cancer, but also getting rid of precancerous lesions.   Schedule Annual MAMMOGRAPHY. [facility] scheduling number is [phone] or schedule in MyChart (self referral).  PREVENTATIVE VISIT: Physical- and pap smear due in October.    If you have already completed these items, please contact the clinic via phone or   [facility] so your care team can review and update your records. Thank you for   choosing [facility] for your healthcare needs. For any questions,   concerns, or to schedule an appointment please contact our clinic.    Healthy Regards,      Your [facility] Care Team

## 2025-07-13 ENCOUNTER — HEALTH MAINTENANCE LETTER (OUTPATIENT)
Age: 55
End: 2025-07-13

## 2025-07-29 DIAGNOSIS — I10 ESSENTIAL HYPERTENSION: ICD-10-CM

## 2025-07-29 RX ORDER — LOSARTAN POTASSIUM AND HYDROCHLOROTHIAZIDE 25; 100 MG/1; MG/1
1 TABLET ORAL DAILY
Qty: 30 TABLET | Refills: 0 | Status: SHIPPED | OUTPATIENT
Start: 2025-07-29

## 2025-08-30 DIAGNOSIS — I10 ESSENTIAL HYPERTENSION: ICD-10-CM

## 2025-08-30 RX ORDER — LOSARTAN POTASSIUM AND HYDROCHLOROTHIAZIDE 25; 100 MG/1; MG/1
1 TABLET ORAL DAILY
Qty: 30 TABLET | Refills: 0 | Status: SHIPPED | OUTPATIENT
Start: 2025-08-30 | End: 2025-09-03

## 2025-09-02 SDOH — HEALTH STABILITY: PHYSICAL HEALTH: ON AVERAGE, HOW MANY MINUTES DO YOU ENGAGE IN EXERCISE AT THIS LEVEL?: 0 MIN

## 2025-09-02 SDOH — HEALTH STABILITY: PHYSICAL HEALTH: ON AVERAGE, HOW MANY DAYS PER WEEK DO YOU ENGAGE IN MODERATE TO STRENUOUS EXERCISE (LIKE A BRISK WALK)?: 0 DAYS

## 2025-09-03 ENCOUNTER — RESULTS FOLLOW-UP (OUTPATIENT)
Dept: FAMILY MEDICINE | Facility: CLINIC | Age: 55
End: 2025-09-03

## 2025-09-03 ENCOUNTER — OFFICE VISIT (OUTPATIENT)
Dept: FAMILY MEDICINE | Facility: CLINIC | Age: 55
End: 2025-09-03
Payer: COMMERCIAL

## 2025-09-03 VITALS
HEART RATE: 84 BPM | WEIGHT: 195.8 LBS | TEMPERATURE: 97.7 F | OXYGEN SATURATION: 98 % | BODY MASS INDEX: 34.69 KG/M2 | DIASTOLIC BLOOD PRESSURE: 80 MMHG | SYSTOLIC BLOOD PRESSURE: 127 MMHG | RESPIRATION RATE: 20 BRPM | HEIGHT: 63 IN

## 2025-09-03 DIAGNOSIS — E78.2 ELEVATED TRIGLYCERIDES WITH HIGH CHOLESTEROL: Primary | ICD-10-CM

## 2025-09-03 DIAGNOSIS — R79.89 ELEVATED SERUM CREATININE: ICD-10-CM

## 2025-09-03 DIAGNOSIS — Z12.31 VISIT FOR SCREENING MAMMOGRAM: ICD-10-CM

## 2025-09-03 DIAGNOSIS — Z13.1 SCREENING FOR DIABETES MELLITUS: ICD-10-CM

## 2025-09-03 DIAGNOSIS — E78.2 ELEVATED TRIGLYCERIDES WITH HIGH CHOLESTEROL: ICD-10-CM

## 2025-09-03 DIAGNOSIS — I10 HYPERTENSION GOAL BP (BLOOD PRESSURE) < 140/90: ICD-10-CM

## 2025-09-03 DIAGNOSIS — E78.1 HYPERTRIGLYCERIDEMIA: ICD-10-CM

## 2025-09-03 DIAGNOSIS — E78.5 HYPERLIPIDEMIA LDL GOAL <100: ICD-10-CM

## 2025-09-03 DIAGNOSIS — Z12.4 CERVICAL CANCER SCREENING: ICD-10-CM

## 2025-09-03 DIAGNOSIS — E66.01 MORBID OBESITY DUE TO EXCESS CALORIES (H): ICD-10-CM

## 2025-09-03 DIAGNOSIS — Z87.891 PERSONAL HISTORY OF TOBACCO USE: ICD-10-CM

## 2025-09-03 DIAGNOSIS — Z12.11 SCREEN FOR COLON CANCER: ICD-10-CM

## 2025-09-03 DIAGNOSIS — Z00.00 ROUTINE GENERAL MEDICAL EXAMINATION AT A HEALTH CARE FACILITY: Primary | ICD-10-CM

## 2025-09-03 DIAGNOSIS — Z13.220 ENCOUNTER FOR LIPID SCREENING FOR CARDIOVASCULAR DISEASE: ICD-10-CM

## 2025-09-03 DIAGNOSIS — Z13.6 ENCOUNTER FOR LIPID SCREENING FOR CARDIOVASCULAR DISEASE: ICD-10-CM

## 2025-09-03 LAB
ALBUMIN SERPL BCG-MCNC: 4.2 G/DL (ref 3.5–5.2)
ALP SERPL-CCNC: 82 U/L (ref 40–150)
ALT SERPL W P-5'-P-CCNC: 38 U/L (ref 0–50)
ANION GAP SERPL CALCULATED.3IONS-SCNC: 12 MMOL/L (ref 7–15)
AST SERPL W P-5'-P-CCNC: 29 U/L (ref 0–45)
BILIRUB SERPL-MCNC: 0.3 MG/DL
BILIRUBIN DIRECT (ROCHE PRO & PURE): 0.13 MG/DL (ref 0–0.45)
BUN SERPL-MCNC: 36.7 MG/DL (ref 6–20)
CALCIUM SERPL-MCNC: 9.8 MG/DL (ref 8.8–10.4)
CHLORIDE SERPL-SCNC: 104 MMOL/L (ref 98–107)
CHOLEST SERPL-MCNC: 259 MG/DL
CREAT SERPL-MCNC: 1.4 MG/DL (ref 0.51–0.95)
EGFRCR SERPLBLD CKD-EPI 2021: 44 ML/MIN/1.73M2
EST. AVERAGE GLUCOSE BLD GHB EST-MCNC: 105 MG/DL
FASTING STATUS PATIENT QL REPORTED: YES
FASTING STATUS PATIENT QL REPORTED: YES
GLUCOSE SERPL-MCNC: 103 MG/DL (ref 70–99)
HBA1C MFR BLD: 5.3 % (ref 0–5.6)
HCO3 SERPL-SCNC: 22 MMOL/L (ref 22–29)
HDLC SERPL-MCNC: 59 MG/DL
LDLC SERPL CALC-MCNC: ABNORMAL MG/DL
LDLC SERPL DIRECT ASSAY-MCNC: 138 MG/DL
NONHDLC SERPL-MCNC: 200 MG/DL
POTASSIUM SERPL-SCNC: 4 MMOL/L (ref 3.4–5.3)
PROT SERPL-MCNC: 7.5 G/DL (ref 6.4–8.3)
SODIUM SERPL-SCNC: 138 MMOL/L (ref 135–145)
TRIGL SERPL-MCNC: 401 MG/DL

## 2025-09-03 PROCEDURE — 80061 LIPID PANEL: CPT

## 2025-09-03 PROCEDURE — 80053 COMPREHEN METABOLIC PANEL: CPT

## 2025-09-03 PROCEDURE — 82248 BILIRUBIN DIRECT: CPT

## 2025-09-03 PROCEDURE — 99214 OFFICE O/P EST MOD 30 MIN: CPT | Mod: 25

## 2025-09-03 PROCEDURE — 99396 PREV VISIT EST AGE 40-64: CPT | Mod: 25

## 2025-09-03 PROCEDURE — 3079F DIAST BP 80-89 MM HG: CPT

## 2025-09-03 PROCEDURE — G2211 COMPLEX E/M VISIT ADD ON: HCPCS

## 2025-09-03 PROCEDURE — 83721 ASSAY OF BLOOD LIPOPROTEIN: CPT | Mod: 59

## 2025-09-03 PROCEDURE — 3044F HG A1C LEVEL LT 7.0%: CPT

## 2025-09-03 PROCEDURE — 83036 HEMOGLOBIN GLYCOSYLATED A1C: CPT

## 2025-09-03 PROCEDURE — 3074F SYST BP LT 130 MM HG: CPT

## 2025-09-03 PROCEDURE — 90750 HZV VACC RECOMBINANT IM: CPT

## 2025-09-03 PROCEDURE — 90471 IMMUNIZATION ADMIN: CPT

## 2025-09-03 PROCEDURE — 36415 COLL VENOUS BLD VENIPUNCTURE: CPT

## 2025-09-03 PROCEDURE — G0296 VISIT TO DETERM LDCT ELIG: HCPCS

## 2025-09-03 RX ORDER — LOSARTAN POTASSIUM AND HYDROCHLOROTHIAZIDE 25; 100 MG/1; MG/1
1 TABLET ORAL DAILY
Qty: 90 TABLET | Refills: 3 | Status: SHIPPED | OUTPATIENT
Start: 2025-09-03

## 2025-09-04 RX ORDER — ATORVASTATIN CALCIUM 20 MG/1
20 TABLET, FILM COATED ORAL DAILY
Qty: 90 TABLET | Refills: 0 | Status: SHIPPED | OUTPATIENT
Start: 2025-09-04